# Patient Record
Sex: MALE | Race: WHITE | NOT HISPANIC OR LATINO | Employment: OTHER | URBAN - METROPOLITAN AREA
[De-identification: names, ages, dates, MRNs, and addresses within clinical notes are randomized per-mention and may not be internally consistent; named-entity substitution may affect disease eponyms.]

---

## 2017-11-16 ENCOUNTER — HOSPITAL ENCOUNTER (OUTPATIENT)
Dept: RADIOLOGY | Facility: HOSPITAL | Age: 62
Discharge: HOME | End: 2017-11-16
Attending: ORTHOPAEDIC SURGERY
Payer: COMMERCIAL

## 2017-11-16 ENCOUNTER — GENERIC CONVERSION - ENCOUNTER (OUTPATIENT)
Dept: OTHER | Facility: OTHER | Age: 62
End: 2017-11-16

## 2017-11-16 DIAGNOSIS — Z47.1 AFTERCARE FOLLOWING JOINT REPLACEMENT SURGERY: ICD-10-CM

## 2017-11-16 PROCEDURE — 73560 X-RAY EXAM OF KNEE 1 OR 2: CPT

## 2018-01-11 NOTE — PROGRESS NOTES
Assessment    1  Right wrist pain (342 43) (H86 531)   2  Closed fracture of distal end of right radius, unspecified fracture morphology, initial   encounter ( 42) (S59 924A)    Plan  Closed fracture of distal end of right radius, unspecified fracture morphology, initial  encounter    · Follow-up Visit in 4 Weeks Evaluation and Treatment  Follow-up  Status: Hold For -  Scheduling  Requested for: 17ZQY0860    Discussion/Summary    I have explained my clinical and radiological impressions to the patient and his wife  Presentation at this time consistent with a cortical step off on the anterior aspect of the distal radius reflective of a distal radius fracture  On physical exam, the patient maintains full range of active flexion, extension, supination and pronation  At this time, he will be placed into a short-arm hard cast for wrist stabilization  He may continue with ice and ibuprofen as needed for his pain  He will return to the office for follow-up in 4 weeks  Possible side effects of new medications were reviewed with the patient/guardian today  The treatment plan was reviewed with the patient/guardian  The patient/guardian understands and agrees with the treatment plan      Chief Complaint  Right wrist pain      History of Present Illness  HPI: Patient is a 68-year-old male presenting today for evaluation of his right wrist pain  He reports on September 4 2016, tripping over his stairs and falling on his outstretched right arm  He reports immediate onset of pain in the right wrist which has persisted up until this time  His pain continues with pain over the dorsal aspect of the right wrist  His pain as a throbbing, achy pain with no radiation of symptoms  Pain is minimal and he has only been using ice for his symptoms  He denies any warmth, crepitus, numbness or tingling  Review of Systems    Constitutional: No fever or chills, feels well, no tiredness, no recent weight loss or weight gain     Eyes: No complaints of red eyes, no eyesight problems  ENT: no complaints of loss of hearing, no nosebleeds, no sore throat  Cardiovascular: No complaints of chest pain, no palpitations, no leg claudication or lower extremity edema  Respiratory: No complaints of shortness of breath, no wheezing, no cough  Gastrointestinal: No complaints of abdominal pain, no constipation, no nausea or vomiting, no diarrhea or bloody stools  Genitourinary: No complaints of dysuria or incontinence, no hesitancy, no nocturia  Musculoskeletal: arthralgias, limb pain and myalgias, but as noted in HPI  Integumentary: No complaints of skin rash or lesion, no itching or dry skin, no skin wounds  Neurological: No complaints of headache, no confusion, no numbness or tingling, no dizziness  Psychiatric: No suicidal thoughts, no anxiety, no depression  Endocrine: No muscle weakness, no frequent urination, no excessive thirst, no feelings of weakness  ROS reviewed  Active Problems    1  Aftercare following right knee joint replacement surgery (V54 81,V43 65) (Z47 1,Z96 651)   2  Arthritis of knee, right (716 96) (M19 90)   3  History of tibial fracture (V15 51) (Z87 81)   4  Osteoarthritis of left knee (715 96) (M17 9)   5  Right knee pain (719 46) (M25 561)   6  S/P TKR (total knee replacement) using cement, right (V43 65) (R65 707)    Past Medical History    The active problems and past medical history were reviewed and updated today  Surgical History    The surgical history was reviewed and updated today  Family History  Family History    · Paternal history of Heart disease (429 9) (I51 9)   · Paternal history of Hypertension (401 9) (I10)    The family history was reviewed and updated today  Social History    · Never a smoker  The social history was reviewed and updated today  The social history was reviewed and is unchanged  Current Meds   1  Acetaminophen 325 MG Oral Tablet Recorded   2   Aspirin 81 MG TABS Recorded   3  Clindamycin HCl - 300 MG Oral Capsule; TAKE 2 CAPSULES 1 HOUR PRIOR TO   DENTAL APPOINTMENT; Therapy: [id number] to (Evaluate:05Kqp9293)  Requested for: 89DQF9121; Last   Rx:[id number] Ordered   4  Clindamycin HCl - 300 MG Oral Capsule; TAKE 2 CAPSULES 1 HOUR PRIOR TO   DENTAL APPOINTMENT; Therapy: 69DDN9619 to (Evaluate:21Cdd3910)  Requested for: 18BXP2398; Last   Rx:[id number] Ordered   5  Fenofibrate 145 MG Oral Tablet Recorded   6  Hydrochlorothiazide 25 MG Oral Tablet Recorded   7  Lisinopril 10 MG Oral Tablet Recorded   8  Vitamin D (Ergocalciferol) 04987 UNIT Oral Capsule Recorded    The medication list was reviewed and updated today  Allergies    1  Penicillins   2  Sulfa Drugs    Vitals  Signs    Systolic: 440  Diastolic: 75  Heart Rate: 84  Height: 6 ft 1 in  Weight: 252 lb   BMI Calculated: 33 25  BSA Calculated: 2 37    Physical Exam    Right Wrist: Appearance: swelling  Tenderness: not the volar aspect of the scaphoid, not the dorsal aspect of the scaphoid, not the volar aspect of the hamate, not the dorsal aspect of the hamate, not the volar aspect of the lunate, not the dorsal aspect of the lunate and not the TFCC  Palpatory findings include no crepitus  ROM: Full  Motor: Normal  Special Tests: negative ulnar grind for TFCC pathology  Right Hand: Palpatory findings include no crepitus and no warmth  Constitutional - General appearance: Normal    Musculoskeletal - Gait and station: Normal  Digits and nails: Normal  Muscle strength/tone: Normal    Cardiovascular - Pulses: Normal  Examination of extremities for edema and/or varicosities: Normal    Skin - Skin and subcutaneous tissue: Normal    Neurologic - Sensation: Normal    Psychiatric - Orientation to person, place, and time: Normal  Mood and affect: Normal    Eyes   Conjunctiva and lids: Normal     Pupils and irises: Normal        Results/Data  I personally reviewed the films/images/results in the office today   My interpretation follows  X-ray Review Cortical step off the anterior aspect of the distal radius reflective of a distal radius fracture  Future Appointments    Date/Time Provider Specialty Site   11/16/2016 01:15 PM ROHIT Ayala   Orthopedic Surgery [address]     Signatures   Electronically signed by : Georgette Buchanan DO; Sep  9 2016  2:14PM EST                       (Author)

## 2018-01-22 VITALS — HEART RATE: 108 BPM | SYSTOLIC BLOOD PRESSURE: 114 MMHG | DIASTOLIC BLOOD PRESSURE: 82 MMHG

## 2018-04-27 ENCOUNTER — APPOINTMENT (OUTPATIENT)
Dept: RADIOLOGY | Facility: CLINIC | Age: 63
End: 2018-04-27
Payer: COMMERCIAL

## 2018-04-27 ENCOUNTER — TRANSCRIBE ORDERS (OUTPATIENT)
Dept: ADMINISTRATIVE | Facility: HOSPITAL | Age: 63
End: 2018-04-27

## 2018-04-27 DIAGNOSIS — M25.551 BILATERAL HIP PAIN: ICD-10-CM

## 2018-04-27 DIAGNOSIS — M25.552 BILATERAL HIP PAIN: ICD-10-CM

## 2018-04-27 DIAGNOSIS — M25.551 BILATERAL HIP PAIN: Primary | ICD-10-CM

## 2018-04-27 DIAGNOSIS — M25.552 BILATERAL HIP PAIN: Primary | ICD-10-CM

## 2018-04-27 PROCEDURE — 73522 X-RAY EXAM HIPS BI 3-4 VIEWS: CPT

## 2018-10-30 NOTE — TELEPHONE ENCOUNTER
Caller: Patient   C/B #:   Dr Yahir Kennedy    Patient is going in for dental work on 11/6/18 and needs a script for antibiotics  He uses [facility] in [place], [place]   Thanks

## 2018-10-31 NOTE — TELEPHONE ENCOUNTER
Please call in a prescription for this patient  It should read:  Cephalexin 500 mg  Four tablets 1 hour before dental visit  Number 40, with no refills  Please call patient and inform when completed    Thank you

## 2019-01-11 ENCOUNTER — TRANSCRIBE ORDERS (OUTPATIENT)
Dept: ADMINISTRATIVE | Facility: HOSPITAL | Age: 64
End: 2019-01-11

## 2019-01-11 DIAGNOSIS — D13.5 BENIGN NEOPLASM OF LIVER AND BILIARY PASSAGES: Primary | ICD-10-CM

## 2019-01-11 DIAGNOSIS — D13.4 BENIGN NEOPLASM OF LIVER AND BILIARY PASSAGES: Primary | ICD-10-CM

## 2019-01-19 ENCOUNTER — HOSPITAL ENCOUNTER (OUTPATIENT)
Dept: MRI IMAGING | Facility: HOSPITAL | Age: 64
Discharge: HOME | End: 2019-01-19
Payer: COMMERCIAL

## 2019-01-19 DIAGNOSIS — D13.4 BENIGN NEOPLASM OF LIVER AND BILIARY PASSAGES: ICD-10-CM

## 2019-01-19 DIAGNOSIS — D13.5 BENIGN NEOPLASM OF LIVER AND BILIARY PASSAGES: ICD-10-CM

## 2019-01-19 PROCEDURE — A9585 GADOBUTROL INJECTION: HCPCS | Performed by: DENTIST

## 2019-01-19 PROCEDURE — 74183 MRI ABD W/O CNTR FLWD CNTR: CPT

## 2019-01-19 RX ADMIN — GADOBUTROL 11 ML: 604.72 INJECTION INTRAVENOUS at 09:04

## 2021-04-08 DIAGNOSIS — Z23 ENCOUNTER FOR IMMUNIZATION: ICD-10-CM

## 2021-12-07 ENCOUNTER — NURSE TRIAGE (OUTPATIENT)
Dept: OTHER | Facility: OTHER | Age: 66
End: 2021-12-07

## 2021-12-07 DIAGNOSIS — Z20.822 ENCOUNTER FOR SCREENING LABORATORY TESTING FOR COVID-19 VIRUS: Primary | ICD-10-CM

## 2021-12-07 PROCEDURE — U0003 INFECTIOUS AGENT DETECTION BY NUCLEIC ACID (DNA OR RNA); SEVERE ACUTE RESPIRATORY SYNDROME CORONAVIRUS 2 (SARS-COV-2) (CORONAVIRUS DISEASE [COVID-19]), AMPLIFIED PROBE TECHNIQUE, MAKING USE OF HIGH THROUGHPUT TECHNOLOGIES AS DESCRIBED BY CMS-2020-01-R: HCPCS | Performed by: FAMILY MEDICINE

## 2021-12-07 PROCEDURE — U0005 INFEC AGEN DETEC AMPLI PROBE: HCPCS | Performed by: FAMILY MEDICINE

## 2023-03-17 ENCOUNTER — HOSPITAL ENCOUNTER (OUTPATIENT)
Dept: NON INVASIVE DIAGNOSTICS | Facility: HOSPITAL | Age: 68
Discharge: HOME | End: 2023-03-17

## 2023-03-17 DIAGNOSIS — Z87.891 FORMER SMOKER: ICD-10-CM

## 2023-04-05 ENCOUNTER — OFFICE VISIT (OUTPATIENT)
Dept: DERMATOLOGY | Facility: CLINIC | Age: 68
End: 2023-04-05

## 2023-04-05 VITALS — WEIGHT: 150 LBS | BODY MASS INDEX: 19.88 KG/M2 | HEIGHT: 73 IN

## 2023-04-05 DIAGNOSIS — D23.9 DILATED PORE OF WINER: Primary | ICD-10-CM

## 2023-04-05 DIAGNOSIS — L82.1 SEBORRHEIC KERATOSIS: ICD-10-CM

## 2023-04-05 RX ORDER — ATORVASTATIN CALCIUM 20 MG/1
20 TABLET, FILM COATED ORAL
COMMUNITY
Start: 2023-01-15

## 2023-04-05 RX ORDER — HYDROCHLOROTHIAZIDE 25 MG/1
1 TABLET ORAL DAILY
COMMUNITY

## 2023-04-05 RX ORDER — LISINOPRIL 20 MG/1
20 TABLET ORAL
COMMUNITY
Start: 2023-01-15

## 2023-04-05 RX ORDER — CHLORAL HYDRATE 500 MG
2 CAPSULE ORAL DAILY
COMMUNITY

## 2023-04-05 RX ORDER — OMEPRAZOLE 20 MG/1
20 TABLET, DELAYED RELEASE ORAL EVERY 24 HOURS
COMMUNITY

## 2023-04-05 RX ORDER — CYANOCOBALAMIN (VITAMIN B-12) 500 MCG
TABLET ORAL EVERY 24 HOURS
COMMUNITY

## 2023-04-05 RX ORDER — FENOFIBRATE 145 MG/1
1 TABLET, COATED ORAL DAILY
COMMUNITY

## 2023-04-05 RX ORDER — DIPHENOXYLATE HYDROCHLORIDE AND ATROPINE SULFATE 2.5; .025 MG/1; MG/1
1 TABLET ORAL DAILY
COMMUNITY

## 2023-04-05 RX ORDER — CLINDAMYCIN HYDROCHLORIDE 300 MG/1
2 CAPSULE ORAL
COMMUNITY
Start: 2015-12-01

## 2023-04-05 NOTE — PROGRESS NOTES
"[facility] Note     Patient Name: Roshan Yi  Encounter Date: 4/5/2023     Have you been cared for by a [facility] Dermatologist in the last 3 years and, if so, which description applies to you? NO  I am considered a \"new\" patient and must complete all patient intake questions  I am MALE/not capable of bearing children  REVIEW OF SYSTEMS:  Have you recently had or currently have any of the following? · Recent fever or chills? No  · Any non-healing wound? No   PAST MEDICAL HISTORY:  Have you personally ever had or currently have any of the following? If \"YES,\" then please provide more detail  · Skin cancer (such as Melanoma, Basal Cell Carcinoma, Squamous Cell Carcinoma? No  · Tuberculosis, HIV/AIDS, Hepatitis B or C: No  · Systemic Immunosuppression such as Diabetes, Biologic or Immunotherapy, Chemotherapy, Organ Transplantation, Bone Marrow Transplantation No  · Radiation Treatment No   FAMILY HISTORY:  Any \"first degree relatives\" (parent, brother, sister, or child) with the following? • Skin Cancer, Pancreatic or Other Cancer? No   PATIENT EXPERIENCE:    • Do you want the Dermatologist to perform a COMPLETE skin exam today including a clinical examination under the \"bra and underwear\" areas? Yes   • If necessary, do we have your permission to call and leave a detailed message on your Preferred Phone number that includes your specific medical information?   Yes      Allergies   Allergen Reactions   • Penicillins Edema and Swelling   • Sulfa Antibiotics Edema and Swelling      Current Outpatient Medications:   •  Aspirin Buf,CaCarb-MgCarb-MgO, 81 MG TABS, Take 1 tablet by mouth daily, Disp: , Rfl:   •  atorvastatin (LIPITOR) 20 mg tablet, Take 20 mg by mouth, Disp: , Rfl:   •  clindamycin (CLEOCIN) 300 MG capsule, Take 2 capsules by mouth, Disp: , Rfl:   •  fenofibrate (TRICOR) 145 mg tablet, Take 1 tablet by mouth daily, Disp: , Rfl:   •  Folic Acid 0 8 MG CAPS, every 24 " "hours, Disp: , Rfl:   •  hydrochlorothiazide (HYDRODIURIL) 25 mg tablet, Take 1 tablet by mouth daily, Disp: , Rfl:   •  lisinopril (ZESTRIL) 20 mg tablet, Take 20 mg by mouth, Disp: , Rfl:   •  metFORMIN (GLUCOPHAGE) 500 mg tablet, Take 500 mg by mouth daily, Disp: , Rfl:   •  multivitamin (THERAGRAN) TABS, Take 1 tablet by mouth daily, Disp: , Rfl:   •  Omega-3 1000 MG CAPS, Take 2 g by mouth daily, Disp: , Rfl:   •  omeprazole (PriLOSEC OTC) 20 MG tablet, 20 mg every 24 hours, Disp: , Rfl:   •  Potassium 99 MG TABS, every 24 hours, Disp: , Rfl:           • Whom besides the patient is providing clinical information about today's encounter?   o NO ADDITIONAL HISTORIAN (patient alone provided history)    Physical Exam and Assessment/Plan by Diagnosis:    DILATED PORE OF Meñolene Danger   Physical Exam:  • Anatomic Location Affected:  Left cheek   • Morphological Description:  Dilated pore  • Pertinent Positives:  • Pertinent Negatives: Additional History of Present Condition:  Discovered upon exam     Assessment and Plan:  Based on a thorough discussion of this condition and the management approach to it (including a comprehensive discussion of the known risks, side effects and potential benefits of treatment), the patient (family) agrees to implement the following specific plan:  • Monitor for any changes    SEBORRHEIC KERATOSIS; NON-INFLAMED    Physical Exam:  • Anatomic Location Affected:  Trunk  • Morphological Description:  Flat and raised, waxy, smooth to warty textured, yellow to brownish-grey to dark brown to blackish, discrete, \"stuck-on\" appearing papules  • Pertinent Positives:  • Pertinent Negatives: Additional History of Present Condition:  Patient reports new bumps on the skin  Denies itch, burn, pain, bleeding or ulceration  Present constantly; nothing seems to make it worse or better  No prior treatment        Assessment and Plan:  Based on a thorough discussion of this condition and the management " "approach to it (including a comprehensive discussion of the known risks, side effects and potential benefits of treatment), the patient (family) agrees to implement the following specific plan:  • Reassured benign  • Monitor for any changes     Seborrheic Keratosis  A seborrheic keratosis is a harmless warty spot that appears during adult life as a common sign of skin aging  Seborrheic keratoses can arise on any area of skin, covered or uncovered, with the usual exception of the palms and soles  They do not arise from mucous membranes  Seborrheic keratoses can have highly variable appearance  Seborrheic keratoses are extremely common  It has been estimated that over 90% of adults over the age of 61 years have one or more of them  They occur in males and females of all races, typically beginning to erupt in the 35s or 45s  They are uncommon under the age of 21 years  The precise cause of seborrhoeic keratoses is not known  Seborrhoeic keratoses are considered degenerative in nature  As time goes by, seborrheic keratoses tend to become more numerous  Some people inherit a tendency to develop a very large number of them; some people may have hundreds of them  The name \"seborrheic keratosis\" is misleading, because these lesions are not limited to a seborrhoeic distribution (scalp, mid-face, chest, upper back), nor are they formed from sebaceous glands, nor are they associated with sebum -- which is greasy  Seborrheic keratosis may also be called \"SK,\" \"Seb K,\" \"basal cell papilloma,\" \"senile wart,\" or \"barnacle  \"      Researchers have noted:  • Eruptive seborrhoeic keratoses can follow sunburn or dermatitis  • Skin friction may be the reason they appear in body folds  • Viral cause (e g , human papillomavirus) seems unlikely  • Stable and clonal mutations or activation of FRFR3, PIK3CA, CLIFFORD, AKT1 and EGFR genes are found in seborrhoeic keratoses  • Seborrhoeic keratosis can arise from solar lentigo  • FRFR3 " "mutations also arise in solar lentigines  These mutations are associated with increased age and location on the head and neck, suggesting a role of ultraviolet radiation in these lesions  • Seborrheic keratoses do not harbour tumour suppressor gene mutations  • Epidermal growth factor receptor inhibitors, which are used to treat some cancers, often result in an increase in verrucal (warty) keratoses  There is no easy way to remove multiple lesions on a single occasion  Unless a specific lesion is \"inflamed\" and is causing pain or stinging/burning or is bleeding, most insurance companies do not authorize treatment  Scribe Attestation    I,:  Jessee Chicas am acting as a scribe while in the presence of the attending physician :       I,:  Rafe Bumpers, MD personally performed the services described in this documentation    as scribed in my presence  :           "

## 2023-04-05 NOTE — PATIENT INSTRUCTIONS
"DILATED PORE OF JOSE     Assessment and Plan:  Based on a thorough discussion of this condition and the management approach to it (including a comprehensive discussion of the known risks, side effects and potential benefits of treatment), the patient (family) agrees to implement the following specific plan:  Monitor for any changes    SEBORRHEIC KERATOSIS; NON-INFLAMED    Assessment and Plan:  Based on a thorough discussion of this condition and the management approach to it (including a comprehensive discussion of the known risks, side effects and potential benefits of treatment), the patient (family) agrees to implement the following specific plan:  Reassured benign  Monitor for any changes     Seborrheic Keratosis  A seborrheic keratosis is a harmless warty spot that appears during adult life as a common sign of skin aging  Seborrheic keratoses can arise on any area of skin, covered or uncovered, with the usual exception of the palms and soles  They do not arise from mucous membranes  Seborrheic keratoses can have highly variable appearance  Seborrheic keratoses are extremely common  It has been estimated that over 90% of adults over the age of 61 years have one or more of them  They occur in males and females of all races, typically beginning to erupt in the 35s or 45s  They are uncommon under the age of 21 years  The precise cause of seborrhoeic keratoses is not known  Seborrhoeic keratoses are considered degenerative in nature  As time goes by, seborrheic keratoses tend to become more numerous  Some people inherit a tendency to develop a very large number of them; some people may have hundreds of them  The name \"seborrheic keratosis\" is misleading, because these lesions are not limited to a seborrhoeic distribution (scalp, mid-face, chest, upper back), nor are they formed from sebaceous glands, nor are they associated with sebum -- which is greasy    Seborrheic keratosis may also be called \"SK,\" " "\"Seb K,\" \"basal cell papilloma,\" \"senile wart,\" or \"barnacle  \"      Researchers have noted:  Eruptive seborrhoeic keratoses can follow sunburn or dermatitis  Skin friction may be the reason they appear in body folds  Viral cause (e g , human papillomavirus) seems unlikely  Stable and clonal mutations or activation of FRFR3, PIK3CA, CLIFFORD, AKT1 and EGFR genes are found in seborrhoeic keratoses  Seborrhoeic keratosis can arise from solar lentigo  FRFR3 mutations also arise in solar lentigines  These mutations are associated with increased age and location on the head and neck, suggesting a role of ultraviolet radiation in these lesions  Seborrheic keratoses do not harbour tumour suppressor gene mutations  Epidermal growth factor receptor inhibitors, which are used to treat some cancers, often result in an increase in verrucal (warty) keratoses  There is no easy way to remove multiple lesions on a single occasion  Unless a specific lesion is \"inflamed\" and is causing pain or stinging/burning or is bleeding, most insurance companies do not authorize treatment      "

## 2024-08-12 ENCOUNTER — APPOINTMENT (OUTPATIENT)
Dept: RADIOLOGY | Facility: CLINIC | Age: 69
End: 2024-08-12
Payer: COMMERCIAL

## 2024-08-12 DIAGNOSIS — M17.12 OSTEOARTHRITIS OF LEFT KNEE, UNSPECIFIED OSTEOARTHRITIS TYPE: ICD-10-CM

## 2024-08-12 PROCEDURE — 73564 X-RAY EXAM KNEE 4 OR MORE: CPT

## 2025-01-28 ENCOUNTER — APPOINTMENT (EMERGENCY)
Dept: RADIOLOGY | Facility: HOSPITAL | Age: 70
End: 2025-01-28
Payer: COMMERCIAL

## 2025-01-28 ENCOUNTER — HOSPITAL ENCOUNTER (EMERGENCY)
Facility: HOSPITAL | Age: 70
Discharge: ANOTHER HOSPITAL | End: 2025-01-29
Attending: EMERGENCY MEDICINE
Payer: COMMERCIAL

## 2025-01-28 ENCOUNTER — APPOINTMENT (EMERGENCY)
Dept: CT IMAGING | Facility: HOSPITAL | Age: 70
End: 2025-01-28
Payer: COMMERCIAL

## 2025-01-28 DIAGNOSIS — I21.4 NSTEMI (NON-ST ELEVATED MYOCARDIAL INFARCTION) (HCC): ICD-10-CM

## 2025-01-28 DIAGNOSIS — R79.89 ELEVATED TROPONIN: ICD-10-CM

## 2025-01-28 DIAGNOSIS — R07.9 CHEST PAIN: Primary | ICD-10-CM

## 2025-01-28 PROBLEM — K21.9 GERD (GASTROESOPHAGEAL REFLUX DISEASE): Status: ACTIVE | Noted: 2025-01-28

## 2025-01-28 PROBLEM — E11.9 DM2 (DIABETES MELLITUS, TYPE 2) (HCC): Status: ACTIVE | Noted: 2025-01-28

## 2025-01-28 PROBLEM — I10 ESSENTIAL HYPERTENSION: Status: ACTIVE | Noted: 2025-01-28

## 2025-01-28 PROBLEM — R93.89 ABNORMAL CT SCAN: Status: ACTIVE | Noted: 2025-01-28

## 2025-01-28 PROBLEM — E78.5 HYPERLIPIDEMIA: Status: ACTIVE | Noted: 2025-01-28

## 2025-01-28 LAB
2HR DELTA HS TROPONIN: 539 NG/L
4HR DELTA HS TROPONIN: 4381 NG/L
ALBUMIN SERPL BCG-MCNC: 4.1 G/DL (ref 3.5–5)
ALP SERPL-CCNC: 57 U/L (ref 34–104)
ALT SERPL W P-5'-P-CCNC: 20 U/L (ref 7–52)
ANION GAP SERPL CALCULATED.3IONS-SCNC: 6 MMOL/L (ref 4–13)
APTT PPP: 29 SECONDS (ref 23–34)
APTT PPP: 39 SECONDS (ref 23–34)
APTT PPP: 51 SECONDS (ref 23–34)
APTT PPP: 56 SECONDS (ref 23–34)
AST SERPL W P-5'-P-CCNC: 27 U/L (ref 13–39)
ATRIAL RATE: 68 BPM
ATRIAL RATE: 76 BPM
ATRIAL RATE: 80 BPM
ATRIAL RATE: 85 BPM
BASOPHILS # BLD AUTO: 0.03 THOUSANDS/ΜL (ref 0–0.1)
BASOPHILS NFR BLD AUTO: 0 % (ref 0–1)
BILIRUB SERPL-MCNC: 0.54 MG/DL (ref 0.2–1)
BUN SERPL-MCNC: 20 MG/DL (ref 5–25)
CALCIUM SERPL-MCNC: 9.2 MG/DL (ref 8.4–10.2)
CARDIAC TROPONIN I PNL SERPL HS: 1320 NG/L (ref ?–50)
CARDIAC TROPONIN I PNL SERPL HS: 5162 NG/L (ref ?–50)
CARDIAC TROPONIN I PNL SERPL HS: 781 NG/L (ref ?–50)
CHLORIDE SERPL-SCNC: 103 MMOL/L (ref 96–108)
CHOLEST SERPL-MCNC: 118 MG/DL (ref ?–200)
CO2 SERPL-SCNC: 29 MMOL/L (ref 21–32)
CREAT SERPL-MCNC: 1.14 MG/DL (ref 0.6–1.3)
EOSINOPHIL # BLD AUTO: 0.24 THOUSAND/ΜL (ref 0–0.61)
EOSINOPHIL NFR BLD AUTO: 3 % (ref 0–6)
ERYTHROCYTE [DISTWIDTH] IN BLOOD BY AUTOMATED COUNT: 12.7 % (ref 11.6–15.1)
EST. AVERAGE GLUCOSE BLD GHB EST-MCNC: 131 MG/DL
EST. AVERAGE GLUCOSE BLD GHB EST-MCNC: 137 MG/DL
GFR SERPL CREATININE-BSD FRML MDRD: 64 ML/MIN/1.73SQ M
GLUCOSE SERPL-MCNC: 101 MG/DL (ref 65–140)
GLUCOSE SERPL-MCNC: 109 MG/DL (ref 65–140)
GLUCOSE SERPL-MCNC: 117 MG/DL (ref 65–140)
GLUCOSE SERPL-MCNC: 120 MG/DL (ref 65–140)
HBA1C MFR BLD: 6.2 %
HBA1C MFR BLD: 6.4 %
HCT VFR BLD AUTO: 45.1 % (ref 36.5–49.3)
HDLC SERPL-MCNC: 36 MG/DL
HGB BLD-MCNC: 14.7 G/DL (ref 12–17)
IMM GRANULOCYTES # BLD AUTO: 0.05 THOUSAND/UL (ref 0–0.2)
IMM GRANULOCYTES NFR BLD AUTO: 1 % (ref 0–2)
INR PPP: 1 (ref 0.85–1.19)
LDLC SERPL CALC-MCNC: 65 MG/DL (ref 0–100)
LIPASE SERPL-CCNC: 14 U/L (ref 11–82)
LYMPHOCYTES # BLD AUTO: 2.38 THOUSANDS/ΜL (ref 0.6–4.47)
LYMPHOCYTES NFR BLD AUTO: 26 % (ref 14–44)
MCH RBC QN AUTO: 30.5 PG (ref 26.8–34.3)
MCHC RBC AUTO-ENTMCNC: 32.6 G/DL (ref 31.4–37.4)
MCV RBC AUTO: 94 FL (ref 82–98)
MONOCYTES # BLD AUTO: 1.14 THOUSAND/ΜL (ref 0.17–1.22)
MONOCYTES NFR BLD AUTO: 12 % (ref 4–12)
NEUTROPHILS # BLD AUTO: 5.43 THOUSANDS/ΜL (ref 1.85–7.62)
NEUTS SEG NFR BLD AUTO: 58 % (ref 43–75)
NRBC BLD AUTO-RTO: 0 /100 WBCS
P AXIS: 48 DEGREES
P AXIS: 63 DEGREES
P AXIS: 64 DEGREES
P AXIS: 67 DEGREES
PLATELET # BLD AUTO: 342 THOUSANDS/UL (ref 149–390)
PMV BLD AUTO: 9 FL (ref 8.9–12.7)
POTASSIUM SERPL-SCNC: 4.1 MMOL/L (ref 3.5–5.3)
PR INTERVAL: 182 MS
PR INTERVAL: 184 MS
PR INTERVAL: 186 MS
PR INTERVAL: 188 MS
PROT SERPL-MCNC: 7 G/DL (ref 6.4–8.4)
PROTHROMBIN TIME: 13.7 SECONDS (ref 12.3–15)
QRS AXIS: 34 DEGREES
QRS AXIS: 34 DEGREES
QRS AXIS: 43 DEGREES
QRS AXIS: 46 DEGREES
QRSD INTERVAL: 88 MS
QRSD INTERVAL: 92 MS
QT INTERVAL: 372 MS
QT INTERVAL: 378 MS
QT INTERVAL: 378 MS
QT INTERVAL: 390 MS
QTC INTERVAL: 414 MS
QTC INTERVAL: 418 MS
QTC INTERVAL: 435 MS
QTC INTERVAL: 449 MS
RBC # BLD AUTO: 4.82 MILLION/UL (ref 3.88–5.62)
SODIUM SERPL-SCNC: 138 MMOL/L (ref 135–147)
T WAVE AXIS: 10 DEGREES
T WAVE AXIS: 12 DEGREES
T WAVE AXIS: 20 DEGREES
T WAVE AXIS: 35 DEGREES
TRIGL SERPL-MCNC: 85 MG/DL (ref ?–150)
VENTRICULAR RATE: 68 BPM
VENTRICULAR RATE: 76 BPM
VENTRICULAR RATE: 80 BPM
VENTRICULAR RATE: 85 BPM
WBC # BLD AUTO: 9.27 THOUSAND/UL (ref 4.31–10.16)

## 2025-01-28 PROCEDURE — 82948 REAGENT STRIP/BLOOD GLUCOSE: CPT

## 2025-01-28 PROCEDURE — 99285 EMERGENCY DEPT VISIT HI MDM: CPT

## 2025-01-28 PROCEDURE — 85730 THROMBOPLASTIN TIME PARTIAL: CPT | Performed by: EMERGENCY MEDICINE

## 2025-01-28 PROCEDURE — 85025 COMPLETE CBC W/AUTO DIFF WBC: CPT | Performed by: EMERGENCY MEDICINE

## 2025-01-28 PROCEDURE — 99285 EMERGENCY DEPT VISIT HI MDM: CPT | Performed by: EMERGENCY MEDICINE

## 2025-01-28 PROCEDURE — 71045 X-RAY EXAM CHEST 1 VIEW: CPT

## 2025-01-28 PROCEDURE — 96365 THER/PROPH/DIAG IV INF INIT: CPT

## 2025-01-28 PROCEDURE — 83036 HEMOGLOBIN GLYCOSYLATED A1C: CPT

## 2025-01-28 PROCEDURE — 36415 COLL VENOUS BLD VENIPUNCTURE: CPT | Performed by: EMERGENCY MEDICINE

## 2025-01-28 PROCEDURE — 96375 TX/PRO/DX INJ NEW DRUG ADDON: CPT

## 2025-01-28 PROCEDURE — 80053 COMPREHEN METABOLIC PANEL: CPT | Performed by: EMERGENCY MEDICINE

## 2025-01-28 PROCEDURE — 96376 TX/PRO/DX INJ SAME DRUG ADON: CPT

## 2025-01-28 PROCEDURE — 84484 ASSAY OF TROPONIN QUANT: CPT

## 2025-01-28 PROCEDURE — 80061 LIPID PANEL: CPT

## 2025-01-28 PROCEDURE — 99205 OFFICE O/P NEW HI 60 MIN: CPT | Performed by: INTERNAL MEDICINE

## 2025-01-28 PROCEDURE — 84484 ASSAY OF TROPONIN QUANT: CPT | Performed by: EMERGENCY MEDICINE

## 2025-01-28 PROCEDURE — 99222 1ST HOSP IP/OBS MODERATE 55: CPT | Performed by: INTERNAL MEDICINE

## 2025-01-28 PROCEDURE — 93010 ELECTROCARDIOGRAM REPORT: CPT | Performed by: INTERNAL MEDICINE

## 2025-01-28 PROCEDURE — 85730 THROMBOPLASTIN TIME PARTIAL: CPT

## 2025-01-28 PROCEDURE — 83036 HEMOGLOBIN GLYCOSYLATED A1C: CPT | Performed by: INTERNAL MEDICINE

## 2025-01-28 PROCEDURE — 93005 ELECTROCARDIOGRAM TRACING: CPT

## 2025-01-28 PROCEDURE — 83690 ASSAY OF LIPASE: CPT | Performed by: EMERGENCY MEDICINE

## 2025-01-28 PROCEDURE — 85610 PROTHROMBIN TIME: CPT | Performed by: EMERGENCY MEDICINE

## 2025-01-28 PROCEDURE — 96366 THER/PROPH/DIAG IV INF ADDON: CPT

## 2025-01-28 PROCEDURE — 71250 CT THORAX DX C-: CPT

## 2025-01-28 RX ORDER — LISINOPRIL 20 MG/1
20 TABLET ORAL DAILY
Status: DISCONTINUED | OUTPATIENT
Start: 2025-01-28 | End: 2025-01-29 | Stop reason: HOSPADM

## 2025-01-28 RX ORDER — ASPIRIN 81 MG/1
324 TABLET, CHEWABLE ORAL ONCE
Status: COMPLETED | OUTPATIENT
Start: 2025-01-28 | End: 2025-01-28

## 2025-01-28 RX ORDER — ASPIRIN 81 MG/1
81 TABLET, CHEWABLE ORAL DAILY
Status: DISCONTINUED | OUTPATIENT
Start: 2025-01-29 | End: 2025-01-29 | Stop reason: HOSPADM

## 2025-01-28 RX ORDER — PANTOPRAZOLE SODIUM 20 MG/1
20 TABLET, DELAYED RELEASE ORAL
Status: DISCONTINUED | OUTPATIENT
Start: 2025-01-29 | End: 2025-01-29 | Stop reason: HOSPADM

## 2025-01-28 RX ORDER — INSULIN LISPRO 100 [IU]/ML
1-6 INJECTION, SOLUTION INTRAVENOUS; SUBCUTANEOUS
Status: DISCONTINUED | OUTPATIENT
Start: 2025-01-28 | End: 2025-01-29 | Stop reason: HOSPADM

## 2025-01-28 RX ORDER — HEPARIN SODIUM 1000 [USP'U]/ML
1950 INJECTION, SOLUTION INTRAVENOUS; SUBCUTANEOUS EVERY 6 HOURS PRN
Status: DISCONTINUED | OUTPATIENT
Start: 2025-01-28 | End: 2025-01-29

## 2025-01-28 RX ORDER — HEPARIN SODIUM 10000 [USP'U]/100ML
3-20 INJECTION, SOLUTION INTRAVENOUS
Status: DISCONTINUED | OUTPATIENT
Start: 2025-01-28 | End: 2025-01-29

## 2025-01-28 RX ORDER — METOPROLOL TARTRATE 25 MG/1
25 TABLET, FILM COATED ORAL EVERY 12 HOURS SCHEDULED
Status: DISCONTINUED | OUTPATIENT
Start: 2025-01-28 | End: 2025-01-29 | Stop reason: HOSPADM

## 2025-01-28 RX ORDER — MAGNESIUM HYDROXIDE/ALUMINUM HYDROXICE/SIMETHICONE 120; 1200; 1200 MG/30ML; MG/30ML; MG/30ML
30 SUSPENSION ORAL ONCE
Status: CANCELLED | OUTPATIENT
Start: 2025-01-28 | End: 2025-01-28

## 2025-01-28 RX ORDER — ASPIRIN 81 MG/1
81 TABLET, CHEWABLE ORAL DAILY
COMMUNITY

## 2025-01-28 RX ORDER — HEPARIN SODIUM 1000 [USP'U]/ML
3900 INJECTION, SOLUTION INTRAVENOUS; SUBCUTANEOUS ONCE
Status: COMPLETED | OUTPATIENT
Start: 2025-01-28 | End: 2025-01-28

## 2025-01-28 RX ORDER — ATORVASTATIN CALCIUM 40 MG/1
80 TABLET, FILM COATED ORAL
Status: DISCONTINUED | OUTPATIENT
Start: 2025-01-28 | End: 2025-01-29 | Stop reason: HOSPADM

## 2025-01-28 RX ORDER — ACETAMINOPHEN 325 MG/1
650 TABLET ORAL EVERY 6 HOURS PRN
Status: DISCONTINUED | OUTPATIENT
Start: 2025-01-28 | End: 2025-01-29 | Stop reason: HOSPADM

## 2025-01-28 RX ORDER — FOLIC ACID 1 MG/1
1 TABLET ORAL DAILY
Status: DISCONTINUED | OUTPATIENT
Start: 2025-01-28 | End: 2025-01-29 | Stop reason: HOSPADM

## 2025-01-28 RX ORDER — NITROGLYCERIN 0.4 MG/1
0.4 TABLET SUBLINGUAL
Status: DISCONTINUED | OUTPATIENT
Start: 2025-01-28 | End: 2025-01-29 | Stop reason: HOSPADM

## 2025-01-28 RX ORDER — HEPARIN SODIUM 1000 [USP'U]/ML
3900 INJECTION, SOLUTION INTRAVENOUS; SUBCUTANEOUS EVERY 6 HOURS PRN
Status: DISCONTINUED | OUTPATIENT
Start: 2025-01-28 | End: 2025-01-29

## 2025-01-28 RX ORDER — PANTOPRAZOLE SODIUM 40 MG/10ML
40 INJECTION, POWDER, LYOPHILIZED, FOR SOLUTION INTRAVENOUS ONCE
Status: COMPLETED | OUTPATIENT
Start: 2025-01-28 | End: 2025-01-28

## 2025-01-28 RX ORDER — ATORVASTATIN CALCIUM 40 MG/1
20 TABLET, FILM COATED ORAL
Status: DISCONTINUED | OUTPATIENT
Start: 2025-01-28 | End: 2025-01-28

## 2025-01-28 RX ADMIN — PANTOPRAZOLE SODIUM 40 MG: 40 INJECTION, POWDER, FOR SOLUTION INTRAVENOUS at 02:43

## 2025-01-28 RX ADMIN — METOPROLOL TARTRATE 25 MG: 25 TABLET, FILM COATED ORAL at 17:08

## 2025-01-28 RX ADMIN — HEPARIN SODIUM 3900 UNITS: 1000 INJECTION INTRAVENOUS; SUBCUTANEOUS at 04:04

## 2025-01-28 RX ADMIN — HEPARIN SODIUM 1950 UNITS: 1000 INJECTION INTRAVENOUS; SUBCUTANEOUS at 17:05

## 2025-01-28 RX ADMIN — HEPARIN SODIUM 12 UNITS/KG/HR: 10000 INJECTION, SOLUTION INTRAVENOUS at 04:04

## 2025-01-28 RX ADMIN — HEPARIN SODIUM 3900 UNITS: 1000 INJECTION INTRAVENOUS; SUBCUTANEOUS at 10:36

## 2025-01-28 RX ADMIN — LISINOPRIL 20 MG: 20 TABLET ORAL at 09:12

## 2025-01-28 RX ADMIN — ATORVASTATIN CALCIUM 80 MG: 40 TABLET, FILM COATED ORAL at 17:07

## 2025-01-28 RX ADMIN — ASPIRIN 81 MG CHEWABLE TABLET 324 MG: 81 TABLET CHEWABLE at 02:42

## 2025-01-28 RX ADMIN — HEPARIN SODIUM 1950 UNITS: 1000 INJECTION INTRAVENOUS; SUBCUTANEOUS at 22:52

## 2025-01-28 RX ADMIN — FOLIC ACID 1 MG: 1 TABLET ORAL at 09:12

## 2025-01-28 NOTE — ED NOTES
Pt instructed to use call bell if onset of chest pain to repeat an EKG during that time       Calli Clements RN  01/28/25 0250

## 2025-01-28 NOTE — ASSESSMENT & PLAN NOTE
Lab Results   Component Value Date    HGBA1C 6.3 (H) 11/25/2015     Originally, patient explains that he was on metformin 500 mg, daily.  However, no longer taking this medication.    Will update hemoglobin A1c, as last appears to be collected in 2015.    Will order Accu-Cheks.  Will order SSI, as needed.

## 2025-01-28 NOTE — ASSESSMENT & PLAN NOTE
Patient presented with about 3-4 weeks of nonradiating, burning chest pain, which he first attributed to viral illness.  Patient expressed that the pain was exerted with physical activity, but resolved with rest.  Per patient, pain is intermittent and has only happened a few times over the course of 3 weeks.  This morning, patient explains that pain was progressively worse and unresolving, which ultimately made decision for him to present to facility.  He describes the pain as a 8 out of 10.  Per patient, history of hypertension and hyperlipidemia. No known history of CAD.   In ED, troponin collected and elevated with hs Tnl 0hr (781), hs Tnl 2hr (1,320)/ Delta (539).   Awaiting results of 4-hour troponin.   ED discussed symptoms, troponins, and EKGs with cardiology.  It was reccommended patient should be transferred to [facility].  Accepted at [facility] but currently at capacity.   Awaiting bed availability and transfer to [facility].   Plan   In ED, received aspirin 324 mg. Will continue 81 mg, daily.  Will order lipid panel and hemoglobin A1c.   Continue heparin drip.   Continue to trend troponins and await peak.   Control pain/discomfort with PRN nitroglycerin.   Continue telemetry.   Appreciate Cardiology consultation and expertise.

## 2025-01-28 NOTE — CONSULTS
"Consultation - Hospitalist   Name: Brandon Thompson 69 y.o. male I MRN: [id number]  Unit/Bed#: ED 04 I Date of Admission: 1/28/2025   Date of Service: 1/28/2025 I Hospital Day: 0       Consult to internal medicine  Consult performed by: JOSE Montelongo  Consult ordered by: Kristin Diggs DO  Reason for consult: chest pain        Physician Requesting Evaluation: Kristin Diggs DO   Reason for Evaluation / Principal Problem: chest pain     Assessment & Plan  Chest pain  Patient presented with about 3-4 weeks of nonradiating, burning chest pain, which he first attributed to viral illness.  Patient expressed that the pain was exerted with physical activity, but resolved with rest.  Per patient, pain is intermittent and has only happened a few times over the course of 3 weeks.  This morning, patient explains that pain was progressively worse and unresolving, which ultimately made decision for him to present to facility.  He describes the pain as a 8 out of 10.  Per patient, history of hypertension and hyperlipidemia. No known history of CAD.   In ED, troponin collected and elevated with hs Tnl 0hr (781), hs Tnl 2hr (1,320)/ Delta (539).   Awaiting results of 4-hour troponin.   ED discussed symptoms, troponins, and EKGs with cardiology.  It was reccommended patient should be transferred to [facility].  Accepted at [facility] but currently at capacity.   Awaiting bed availability and transfer to [place].   Plan   In ED, received aspirin 324 mg. Will continue 81 mg, daily.  Will order lipid panel and hemoglobin A1c.   Continue heparin drip.   Continue to trend troponins and await peak.   Control pain/discomfort with PRN nitroglycerin.   Continue telemetry.   Appreciate Cardiology consultation and expertise.  Abnormal CT scan  In ED, CT chest without contrast completed with findings of \"No acute abnormality identified. Findings consistent with fibrotic interstitial lung disease.\"   Could consider pulmonology consult.  Would encourage " "continued follow-up with pulmonology in outpatient setting.  Essential hypertension  Will continue home regimen of Lisinopril 20 mg, daily.  Continue to monitor vital signs, per unit protocol.   GERD (gastroesophageal reflux disease)  Will continue home regimen of omeprazole 20 mg, daily.  Type 2 diabetes mellitus without complication, without long-term current use of insulin ([facility])    Lab Results   Component Value Date    HGBA1C 6.3 (H) 11/25/2015     Originally, patient explains that he was on metformin 500 mg, daily.  However, no longer taking this medication.    Will update hemoglobin A1c, as last appears to be collected in 2015.    Will order Accu-Cheks.  Will order SSI, as needed.    Hyperlipidemia  Will continue home regimen of atorvastatin 20 mg, daily.   Will update lipid panel.    VTE Pharmacologic Prophylaxis: VTE Score: 3 Moderate Risk (Score 3-4) - Pharmacological DVT Prophylaxis Ordered: heparin drip.  Discussion with family: Updated  (wife) at bedside.    Anticipated Length of Stay: Pending transfer to [place].     History of Present Illness   Chief Complaint: \"The pain has been getting progressively worse and not letting up.\"    Brandon Thompson is a 69 y.o. male with a PMH of HTN, HLD, and GERD who presents with worsening chest pain.  Per patient, he explains that he had diffuse chest pain across chest, which he noted around December.  However, during this time patient was noted to be completing physical activities outside and going to the gym.  Also, during this time patient noted that he had a viral illness, which he attributed the symptoms of chest pain to viral syndrome.  However, he noted that the pain was intermittent and persistent, which seemed odd to him as he is no longer sick.  Patient expresses that the chest pain that he is experiencing has been starting to progressively worse and takes longer to relieve.  Unfortunately, the patient expresses that his father " unfortunately passed away on Sunday.  This morning, patient expresses that the pain came on suddenly, noting diffuse bilateral chest pain of a burning nature.  However, due to the severity and lack of relief it was ultimately decided for the patient to have further evaluation at [facility].     On arrival, patient noted to have increased troponins without ST elevation.  Patient expresses that he did have chest pain on arrival, but has not had chest pain since.  Patient will be transferred to [place] for cardiac catheterization and further evaluation by the cardiology team.    Review of Systems   Constitutional:  Negative for activity change, chills, fatigue and fever.   HENT:  Negative for congestion, postnasal drip, rhinorrhea and sinus pressure.    Respiratory:  Negative for cough, chest tightness, shortness of breath and wheezing.    Cardiovascular:  Positive for chest pain. Negative for palpitations and leg swelling.   Gastrointestinal:  Negative for abdominal pain, constipation, diarrhea, nausea and vomiting.   Genitourinary:  Negative for dysuria.   Musculoskeletal:  Negative for arthralgias, joint swelling and myalgias.   Neurological:  Negative for dizziness, syncope, weakness, light-headedness, numbness and headaches.   Psychiatric/Behavioral:  Negative for confusion.        Historical Information   No past medical history on file.  No past surgical history on file.  Social History     Tobacco Use    Smoking status: Never    Smokeless tobacco: Never   Substance and Sexual Activity    Alcohol use: Yes     Alcohol/week: 1.0 standard drink of alcohol     Types: 1 Standard drinks or equivalent per week    Drug use: Never    Sexual activity: Not Currently     E-Cigarette/Vaping     E-Cigarette/Vaping Substances       Social History:  Marital Status: /Civil Union   Patient Pre-hospital Living Situation: Home  Patient Pre-hospital Level of Mobility: walks    Meds/Allergies   I reviewed the home meds with the  patient and patient's wife in the emergency department..   Prior to Admission medications    Medication Sig Start Date End Date Taking? Authorizing Provider   aspirin 81 mg chewable tablet Chew 81 mg daily   Yes Historical Provider, MD   atorvastatin (LIPITOR) 20 mg tablet Take 20 mg by mouth 1/15/23  Yes Historical Provider, MD   clindamycin (CLEOCIN) 300 MG capsule Take 2 capsules by mouth 12/1/15  Yes Historical Provider, MD   Folic Acid 0.8 MG CAPS every 24 hours   Yes Historical Provider, MD   lisinopril (ZESTRIL) 20 mg tablet Take 20 mg by mouth 1/15/23  Yes Historical Provider, MD   multivitamin (THERAGRAN) TABS Take 1 tablet by mouth daily   Yes Historical Provider, MD   [place]-3 1000 MG CAPS Take 2 g by mouth daily   Yes Historical Provider, MD   omeprazole (PriLOSEC OTC) 20 MG tablet 20 mg every 24 hours   Yes Historical Provider, MD   Potassium 99 MG TABS every 24 hours   Yes Historical Provider, MD   Aspirin Buf,CaCarb-MgCarb-MgO, 81 MG TABS Take 1 tablet by mouth daily    Historical Provider, MD   fenofibrate (TRICOR) 145 mg tablet Take 1 tablet by mouth daily    Historical Provider, MD   hydrochlorothiazide (HYDRODIURIL) 25 mg tablet Take 1 tablet by mouth daily  Patient not taking: Reported on 1/28/2025 1/28/25  Historical Provider, MD   metFORMIN (GLUCOPHAGE) 500 mg tablet Take 500 mg by mouth daily  Patient not taking: Reported on 1/28/2025 1/20/23 1/28/25  Historical Provider, MD     Allergies   Allergen Reactions    Penicillins Edema and Swelling    Sulfa Antibiotics Edema and Swelling       Objective :  Temp:  [98.5 °F (36.9 °C)] 98.5 °F (36.9 °C)  HR:  [59-82] 59  BP: (102-137)/(57-83) 102/57  Resp:  [13-20] 19  SpO2:  [96 %-100 %] 97 %  O2 Device: None (Room air)    Physical Exam  Vitals and nursing note reviewed.   Constitutional:       General: He is awake. He is not in acute distress.     Appearance: He is not ill-appearing.   HENT:      Head: Normocephalic and atraumatic.   Eyes:       General: No scleral icterus.     Conjunctiva/sclera: Conjunctivae normal.      Comments: On exam, patient wears glasses.   Cardiovascular:      Rate and Rhythm: Normal rate and regular rhythm.      Pulses:           Radial pulses are 2+ on the right side and 2+ on the left side.        Dorsalis pedis pulses are 1+ on the right side and 1+ on the left side.      Heart sounds: Normal heart sounds, S1 normal and S2 normal. No murmur heard.     Comments: On exam, patient's extremities are well-perfused and warm.  Pulmonary:      Effort: Pulmonary effort is normal.      Breath sounds: Normal breath sounds and air entry. No decreased air movement. No decreased breath sounds, wheezing, rhonchi or rales.   Abdominal:      General: Bowel sounds are normal.      Palpations: Abdomen is soft.      Tenderness: There is no abdominal tenderness.   Musculoskeletal:      Right lower leg: No edema.      Left lower leg: No edema.   Skin:     General: Skin is warm and dry.      Comments: On exam, no evidence of open lesions or wounds on exposed skin.    Neurological:      Mental Status: He is alert and oriented to person, place, and time.      Motor: Motor function is intact.      Coordination: Coordination is intact.         Lines/Drains:        Lab Results: I have reviewed the following results:  Results from last 7 days   Lab Units 01/28/25  0240   WBC Thousand/uL 9.27   HEMOGLOBIN g/dL 14.7   HEMATOCRIT % 45.1   PLATELETS Thousands/uL 342   SEGS PCT % 58   LYMPHO PCT % 26   MONO PCT % 12   EOS PCT % 3     Results from last 7 days   Lab Units 01/28/25  0240   SODIUM mmol/L 138   POTASSIUM mmol/L 4.1   CHLORIDE mmol/L 103   CO2 mmol/L 29   BUN mg/dL 20   CREATININE mg/dL 1.14   ANION GAP mmol/L 6   CALCIUM mg/dL 9.2   ALBUMIN g/dL 4.1   TOTAL BILIRUBIN mg/dL 0.54   ALK PHOS U/L 57   ALT U/L 20   AST U/L 27   GLUCOSE RANDOM mg/dL 120     Results from last 7 days   Lab Units 01/28/25  0335   INR  1.00         Lab Results   Component  Value Date    HGBA1C 6.3 (H) 11/25/2015           Imaging Results Review: I reviewed radiology reports from this admission including: CT chest.  Other Study Results Review: EKG was reviewed.     Administrative Statements       ** Please Note: This note has been constructed using a voice recognition system. **

## 2025-01-28 NOTE — ASSESSMENT & PLAN NOTE
Patient takes atorvastatin 20 mg daily, and tricor 145 mg   1/28/25 lipid panel: Tchol 118, LDL 65, HDL 36

## 2025-01-28 NOTE — EMTALA/ACUTE CARE TRANSFER
[facility] EMERGENCY DEPARTMENT  [address]  [facility] [address]  Dept: [phone]      EMTALA TRANSFER CONSENT    NAME Brandon Thompson                                         1955                              MRN [id number]    I have been informed of my rights regarding examination, treatment, and transfer   by Dr. Kristin Diggs DO    Benefits: Specialized equipment and/or services available at the receiving facility (Include comment)________________________ (Cardiac Cath/Interventional Cardiology)    Risks: Potential for delay in receiving treatment, Potential deterioration of medical condition, Loss of IV, Increased discomfort during transfer      Consent for Transfer:  I acknowledge that my medical condition has been evaluated and explained to me by the emergency department physician or other qualified medical person and/or my attending physician, who has recommended that I be transferred to the service of  Accepting Physician: Dr. Marcial at Accepting Facility Name, City & State : [facility]. The above potential benefits of such transfer, the potential risks associated with such transfer, and the probable risks of not being transferred have been explained to me, and I fully understand them.  The doctor has explained that, in my case, the benefits of transfer outweigh the risks.  I agree to be transferred.    I authorize the performance of emergency medical procedures and treatments upon me in both transit and upon arrival at the receiving facility.  Additionally, I authorize the release of any and all medical records to the receiving facility and request they be transported with me, if possible.  I understand that the safest mode of transportation during a medical emergency is an ambulance and that the Hospital advocates the use of this mode of transport. Risks of traveling to the receiving facility by car, including absence of medical control, life sustaining equipment, such  as oxygen, and medical personnel has been explained to me and I fully understand them.    (MANOLO CORRECT BOX BELOW)  [  ]  I consent to the stated transfer and to be transported by ambulance/helicopter.  [  ]  I consent to the stated transfer, but refuse transportation by ambulance and accept full responsibility for my transportation by car.  I understand the risks of non-ambulance transfers and I exonerate the Hospital and its staff from any deterioration in my condition that results from this refusal.    X___________________________________________    DATE  25  TIME________  Signature of patient or legally responsible individual signing on patient behalf           RELATIONSHIP TO PATIENT_________________________          Provider Certification    NAME Brandon Thompson                                         1955                              MRN [id number]    A medical screening exam was performed on the above named patient.  Based on the examination:    Condition Necessitating Transfer The primary encounter diagnosis was Chest pain. A diagnosis of Elevated troponin was also pertinent to this visit.    Patient Condition: The patient has been stabilized such that within reasonable medical probability, no material deterioration of the patient condition or the condition of the unborn child(marisol) is likely to result from the transfer    Reason for Transfer: Level of Care needed not available at this facility    Transfer Requirements: Facility B   Space available and qualified personnel available for treatment as acknowledged by    Agreed to accept transfer and to provide appropriate medical treatment as acknowledged by       Dr. Marcial  Appropriate medical records of the examination and treatment of the patient are provided at the time of transfer   STAFF INITIAL WHEN COMPLETED _______  Transfer will be performed by qualified personnel from    and appropriate transfer equipment as required, including the  use of necessary and appropriate life support measures.    Provider Certification: I have examined the patient and explained the following risks and benefits of being transferred/refusing transfer to the patient/family:         Based on these reasonable risks and benefits to the patient and/or the unborn child(marisol), and based upon the information available at the time of the patient’s examination, I certify that the medical benefits reasonably to be expected from the provision of appropriate medical treatments at another medical facility outweigh the increasing risks, if any, to the individual’s medical condition, and in the case of labor to the unborn child, from effecting the transfer.    X____________________________________________ DATE 01/28/25        TIME_______      ORIGINAL - SEND TO MEDICAL RECORDS   COPY - SEND WITH PATIENT DURING TRANSFER

## 2025-01-28 NOTE — ASSESSMENT & PLAN NOTE
"In ED, CT chest without contrast completed with findings of \"No acute abnormality identified. Findings consistent with fibrotic interstitial lung disease.\"   Could consider pulmonology consult.  Would encourage continued follow-up with pulmonology in outpatient setting.  "

## 2025-01-28 NOTE — ED CARE HANDOFF
Emergency Department Sign Out Note        Sign out and transfer of care from Dr. Diggs. See Separate Emergency Department note.     The patient, Brandon Thompson, was evaluated by the previous provider for NSTEMI.    Workup Completed:  See previous notes    ED Course / Workup Pending (followup):  EKG w/o evidence of STEMI per my review. Elevated troponin. Delta trop +500. Vitals okay. On heparin gtt. SLIM and cardiology consulted for medical management. Plan for transfer to [facility] pending bed availability.                                      Procedures  Medical Decision Making  Amount and/or Complexity of Data Reviewed  Labs: ordered.  Radiology: ordered.    Risk  OTC drugs.  Prescription drug management.            Disposition  Final diagnoses:   Chest pain   Elevated troponin   NSTEMI (non-ST elevated myocardial infarction) (HCC)     Time reflects when diagnosis was documented in both MDM as applicable and the Disposition within this note       Time User Action Codes Description Comment    1/28/2025  3:45 AM Kristin Diggs Add [R07.9] Chest pain     1/28/2025  3:45 AM Kristin Diggs Add [R79.89] Elevated troponin     1/28/2025  3:45 AM Kristin Diggs Remove [R79.89] Elevated troponin     1/28/2025  3:45 AM Dontae Kristin Add [R79.89] Elevated troponin     1/28/2025  6:07 AM Kristin Diggs Add [I21.4] NSTEMI (non-ST elevated myocardial infarction) (HCC)           ED Disposition       ED Disposition   Transfer to Another Facility-In Network    Condition   --    Date/Time   Tue Jan 28, 2025  4:12 AM    Comment   Brandon Thompson should be transferred out to hospitals, Dr. Marcial.               MD Documentation      Flowsheet Row Most Recent Value   Patient Condition The patient has been stabilized such that within reasonable medical probability, no material deterioration of the patient condition or the condition of the unborn child(marisol) is likely to result from the transfer   Reason for Transfer Level of Care needed not available at  this facility   Benefits of Transfer Specialized equipment and/or services available at the receiving facility (Include comment)________________________  [Cardiac Cath/Interventional Cardiology]   Risks of Transfer Potential for delay in receiving treatment, Potential deterioration of medical condition, Loss of IV, Increased discomfort during transfer   Accepting Physician Dr. Marcial   Accepting Facility Name, City & State  [facility]   Sending MD Dr. Diggs          RN Documentation      Flowsheet Row Most Recent Value   Accepting Facility Name, City & State  [facility]          Follow-up Information    None       Patient's Medications   Discharge Prescriptions    No medications on file     No discharge procedures on file.       ED Provider  Electronically Signed by     César Gusman DO  01/28/25 0737

## 2025-01-28 NOTE — ASSESSMENT & PLAN NOTE
Patient presents with burning chest discomfort which began intermittently in December, which he associated with a viral illness.  Pain occurred in bilateral chest without associated symptoms, occurring with rest and activity, typically for less than 5 minutes, resolving without intervention.  Last night the chest burning lasted longer than 5 minutes prompting him to seek evaluation in the ER.     EKG on arrival with non specific ST changes, Troponin's elevated 720<[id number]  - he was given 324 of aspirin and a heparin drip was initiated   CONNIE pending

## 2025-01-28 NOTE — ASSESSMENT & PLAN NOTE
Will continue home regimen of Lisinopril 20 mg, daily.  Continue to monitor vital signs, per unit protocol.

## 2025-01-28 NOTE — ED PROVIDER NOTES
Time reflects when diagnosis was documented in both MDM as applicable and the Disposition within this note       Time User Action Codes Description Comment    1/28/2025  3:45 AM Kristin Diggs [R07.9] Chest pain     1/28/2025  3:45 AM Kristin Diggs Add [R79.89] Elevated troponin     1/28/2025  3:45 AM Kristin Diggs Remove [R79.89] Elevated troponin     1/28/2025  3:45 AM Kristin Diggs Add [R79.89] Elevated troponin     1/28/2025  6:07 AM Kristin Diggs Add [I21.4] NSTEMI (non-ST elevated myocardial infarction) (HCC)           ED Disposition       ED Disposition   Transfer to Another Facility-In Network    Condition   --    Date/Time   Tue Jan 28, 2025  4:12 AM    Comment   Brandon JOE Donna should be transferred out to [facility], Dr. Marcial.               Assessment & Plan       Medical Decision Making  69-year-old male with substernal and epigastric pain, differential diagnosis includes ACS, arrhythmia, pneumothorax, pneumonia, esophagitis, gastritis, pancreatitis, myocarditis, pericarditis, pleuritis, bronchitis  Initial EKG patient had minimal ST elevation with inverted T wave in lead III with no previous EKG available, does not meet STEMI criteria at the time of presentation has no active chest pain currently, EKG with nonspecific changes on subsequent serial EKGs.  Given patient's intermittent symptoms in setting of likely upper respiratory infection symptoms may be in setting of myopericarditis however patient does have risk factors for cardiac disease including age and hypertension as well as hyperlipidemia will discuss with cardiology patient will likely require admission for further cardiac evaluation    Amount and/or Complexity of Data Reviewed  Labs: ordered.  Radiology: ordered. Decision-making details documented in ED Course.    Risk  OTC drugs.  Prescription drug management.        ED Course as of 01/29/25 0354   Tue Jan 28, 2025   0228 Procedure Note: EKG  Date/Time: 01/28/25 2:40 AM   Performed by: VIOLET  DANIS  Authorized by: DANIS LAZO  Indications / Diagnosis: CP  ECG reviewed by me, the ED Provider: yes   The EKG demonstrates:  Rhythm: normal sinus  Intervals: normal intervals  Axis: normal axis  QRS/Blocks: normal QRS  ST Changes: TENISHA <1 mm in III,   I aVL with minimal STD <1 mm  .  Inverted T wave in III       0236 Procedure Note: EKG  Date/Time: 01/28/25 2:41 AM   Performed by: DANIS LAZO  Authorized by: DANIS LAZO  Indications / Diagnosis: CP  ECG reviewed by me, the ED Provider: yes   The EKG demonstrates:  Rhythm: normal sinus  Intervals: normal intervals  Axis: normal axis  QRS/Blocks: normal QRS  ST Changes: Nonspecific changes, inverted T wave in III, minimal STD            0242 Patient currently not having any significant chest discomfort, EKG does not meet STEMI criteria at this point   0335 Patient with elevated initial troponin remains pain-free, EKG with nonspecific changes does not meet STEMI criteria at this point, given elevated troponin concerning chest pain starting on heparin, discussing with cardiology as patient will likely require transfer   0338 Discussing with cardiology, will transfer to [facility] for further evaluation getting heparin, aspirin will hold off on dual antiplatelets   0346 XR chest portable  Left sided hazy infiltrate   0411 Patient reported some tingling in his anterior chest wall, repeated EKG without any acute ischemic changes   0412 Accepted by MAXIMILIAN Madrigal   0607 Discussed with PACS currently no beds available at [facility], slim consult placed, cardiology consult placed       Medications   heparin (porcine) 25,000 units in 0.45% NaCl 250 mL infusion (premix) (20 Units/kg/hr × 65 kg (Order-Specific) Intravenous New Bag 1/29/25 0149)   heparin (porcine) injection 3,900 Units (3,900 Units Intravenous Given 1/28/25 1036)   heparin (porcine) injection 1,950 Units (1,950 Units Intravenous Given 1/28/25 2252)   aspirin chewable tablet 81 mg (has no  "administration in time range)   folic acid (FOLVITE) tablet 1 mg (1 mg Oral Given 1/28/25 0912)   lisinopril (ZESTRIL) tablet 20 mg (20 mg Oral Given 1/28/25 0912)   pantoprazole (PROTONIX) EC tablet 20 mg (has no administration in time range)   acetaminophen (TYLENOL) tablet 650 mg (has no administration in time range)   insulin lispro (HumALOG/ADMELOG) 100 units/mL subcutaneous injection 1-6 Units ( Subcutaneous Not Given 1/28/25 1559)   insulin lispro (HumALOG/ADMELOG) 100 units/mL subcutaneous injection 1-6 Units ( Subcutaneous Not Given 1/28/25 2238)   nitroglycerin (NITROSTAT) SL tablet 0.4 mg (has no administration in time range)   metoprolol tartrate (LOPRESSOR) tablet 25 mg (25 mg Oral Given 1/28/25 1708)   atorvastatin (LIPITOR) tablet 80 mg (80 mg Oral Given 1/28/25 1707)   pantoprazole (PROTONIX) injection 40 mg (40 mg Intravenous Given 1/28/25 0243)   aspirin chewable tablet 324 mg (324 mg Oral Given 1/28/25 0242)   heparin (porcine) injection 3,900 Units (3,900 Units Intravenous Given 1/28/25 0404)       ED Risk Strat Scores                          SBIRT 20yo+      Flowsheet Row Most Recent Value   Initial Alcohol Screen: US AUDIT-C     1. How often do you have a drink containing alcohol? 0 Filed at: 01/28/2025 0230   2. How many drinks containing alcohol do you have on a typical day you are drinking?  0 Filed at: 01/28/2025 0230   3a. Male UNDER 65: How often do you have five or more drinks on one occasion? 0 Filed at: 01/28/2025 0230   Audit-C Score 0 Filed at: 01/28/2025 0230   JOHNNY: How many times in the past year have you...    Used an illegal drug or used a prescription medication for non-medical reasons? Never Filed at: 01/28/2025 0230                            History of Present Illness       Chief Complaint   Patient presents with    Chest Pain     Pt states chest \"burning\" that woke him up from sleep around 0100.       No past medical history on file.   No past surgical history on file. "   No family history on file.   Social History     Tobacco Use    Smoking status: Never    Smokeless tobacco: Never   Substance Use Topics    Alcohol use: Yes     Alcohol/week: 1.0 standard drink of alcohol     Types: 1 Standard drinks or equivalent per week    Drug use: Never      E-Cigarette/Vaping      E-Cigarette/Vaping Substances      I have reviewed and agree with the history as documented.     69-year-old male with history of hypertension hyperlipidemia presents for evaluation of bilateral mid chest burning sensation that woke him up from sleep, lasted approximately 1 hour and since almost entirely resolved no associated shortness of breath.  He does state that he had some diaphoresis this morning but currently no longer feels it. pain in nonexertional.  It is not radiating states that he has been having this discomfort on and off for the last couple of weeks initially felt it was related to an upper respiratory infection and was treated it with over-the-counter medications.  He had a dry cough, no significant shortness of breath, no fevers no pleuritic chest pain.  Initially patient states that he would noticed the discomfort when he was breathing in cold air and did notice it several times where he got really agitated/excited over the last few days, he states that he recently lost his father over the weekend and though he has been under more stress recently now he is able to take care of himself.  Patient states that the feeling comes and goes, it is not usually exertional and up to about a week ago he was going to the gym frequently and many times was riding bikes for multiple miles without any discomfort.   He does not have any personal history of CAD, does have history of high blood pressure and high cholesterol as well as prediabetes.        Review of Systems   Constitutional:  Negative for appetite change, chills and fever.   HENT:  Negative for rhinorrhea and sore throat.    Eyes:  Negative for  photophobia and visual disturbance.   Respiratory:  Negative for cough and shortness of breath.    Cardiovascular:  Positive for chest pain. Negative for palpitations.   Gastrointestinal:  Positive for abdominal pain. Negative for diarrhea.   Genitourinary:  Negative for dysuria, frequency and urgency.   Skin:  Negative for rash.   Neurological:  Negative for dizziness and weakness.   All other systems reviewed and are negative.          Objective       ED Triage Vitals   Temperature Pulse Blood Pressure Respirations SpO2 Patient Position - Orthostatic VS   01/28/25 0240 01/28/25 0227 01/28/25 0227 01/28/25 0227 01/28/25 0227 01/28/25 0227   98.5 °F (36.9 °C) 82 137/83 18 98 % Lying      Temp Source Heart Rate Source BP Location FiO2 (%) Pain Score    01/28/25 0240 01/28/25 1043 01/28/25 0227 -- 01/28/25 0227    Oral Monitor Right arm  3      Vitals      Date and Time Temp Pulse SpO2 Resp BP Pain Score FACES Pain Rating User   01/28/25 2215 -- 79 96 % 20 -- -- -- MB   01/28/25 2100 -- 63 95 % 19 120/55 -- -- LK   01/28/25 1715 -- 85 97 % 20 124/58 -- -- MB   01/28/25 1708 -- 80 -- -- 124/58 -- -- LK   01/28/25 1310 -- 63 98 % 19 111/66 2 -- CAC   01/28/25 1130 -- 53 95 % 20 114/55 2 -- CAC   01/28/25 1043 -- 71 98 % 18 117/60 2 -- CAC   01/28/25 0928 -- -- -- -- -- 2 -- EW   01/28/25 0912 -- -- -- -- 113/61 -- -- EW   01/28/25 0700 -- 62 96 % 20 123/59 1 -- CAC   01/28/25 0500 -- 59 97 % 19 102/57 -- -- MG   01/28/25 0430 -- 62 96 % 20 115/64 -- -- MG   01/28/25 0330 -- 59 99 % 13 119/67 -- -- MG   01/28/25 0300 -- 64 100 % 13 124/63 -- -- MG   01/28/25 0250 -- -- -- -- -- 2 -- MG   01/28/25 0240 98.5 °F (36.9 °C) -- -- -- -- -- -- MG   01/28/25 0227 -- 82 98 % 18 137/83 3 -- MG            Physical Exam  Vitals and nursing note reviewed.   Constitutional:       Appearance: He is well-developed.   HENT:      Head: Normocephalic and atraumatic.      Right Ear: External ear normal.      Left Ear: External ear normal.    Eyes:      Conjunctiva/sclera: Conjunctivae normal.      Pupils: Pupils are equal, round, and reactive to light.   Neck:      Vascular: No JVD.      Trachea: No tracheal deviation.   Cardiovascular:      Rate and Rhythm: Normal rate and regular rhythm.      Heart sounds: Normal heart sounds. No murmur heard.     No friction rub. No gallop.   Pulmonary:      Effort: Pulmonary effort is normal. No respiratory distress.      Breath sounds: No stridor. No wheezing or rales.   Abdominal:      General: There is no distension.      Palpations: Abdomen is soft. There is no mass.      Tenderness: There is no abdominal tenderness. There is no guarding or rebound.   Musculoskeletal:         General: Normal range of motion.      Cervical back: Normal range of motion and neck supple.   Skin:     General: Skin is warm and dry.      Coloration: Skin is not pale.      Findings: No erythema or rash.   Neurological:      Mental Status: He is alert and oriented to person, place, and time.      Cranial Nerves: No cranial nerve deficit.         Results Reviewed       Procedure Component Value Units Date/Time    APTT six (6) hours after Heparin bolus/drip initiation or dosing change [[id number]]     Lab Status: No result Specimen: Blood     APTT six (6) hours after Heparin bolus/drip initiation or dosing change [[id number]]  (Abnormal) Collected: 01/28/25 2215    Lab Status: Final result Specimen: Blood from Arm, Left Updated: 01/28/25 2234     PTT 56 seconds     Fingerstick Glucose (POCT) [[id number]]  (Normal) Collected: 01/28/25 2212    Lab Status: Final result Specimen: Blood Updated: 01/28/25 2213     POC Glucose 109 mg/dl     APTT [[id number]]  (Abnormal) Collected: 01/28/25 1559    Lab Status: Final result Specimen: Blood from Arm, Left Updated: 01/28/25 1634     PTT 51 seconds     Fingerstick Glucose (POCT) [[id number]]  (Normal) Collected: 01/28/25 1554    Lab Status: Final result Specimen: Blood Updated: 01/28/25 1555     POC  Glucose 101 mg/dl     Hemoglobin A1C [[id number]]  (Abnormal) Collected: 01/28/25 0240    Lab Status: Final result Specimen: Blood from Arm, Right Updated: 01/28/25 1428     Hemoglobin A1C 6.4 %       mg/dl     Hemoglobin A1c w/EAG Estimation (Prechecked if no A1C within 90 days) [[id number]]  (Abnormal) Collected: 01/28/25 0722    Lab Status: Final result Specimen: Blood from Arm, Right Updated: 01/28/25 1335     Hemoglobin A1C 6.2 %       mg/dl     APTT six (6) hours after Heparin bolus/drip initiation or dosing change [[id number]]  (Abnormal) Collected: 01/28/25 1003    Lab Status: Final result Specimen: Blood from Arm, Right Updated: 01/28/25 1028     PTT 39 seconds     Fingerstick Glucose (POCT) [[id number]]  (Normal) Collected: 01/28/25 0904    Lab Status: Final result Specimen: Blood Updated: 01/28/25 0905     POC Glucose 117 mg/dl     HS Troponin I 4hr [[id number]]  (Abnormal) Collected: 01/28/25 0647    Lab Status: Final result Specimen: Blood from Arm, Right Updated: 01/28/25 0754     hs TnI 4hr 5,162 ng/L      Delta 4hr hsTnI 4,381 ng/L     Narrative:      verified by repeat analysis. : Repeat testing, per SOPverified by repeat analysis. : Check specimen for fibrin and specimen integrity.    Lipid Panel with Direct LDL reflex [[id number]]  (Abnormal) Collected: 01/28/25 0436    Lab Status: Final result Specimen: Blood from Arm, Right Updated: 01/28/25 0656     Cholesterol 118 mg/dL      Triglycerides 85 mg/dL      HDL, Direct 36 mg/dL      LDL Calculated 65 mg/dL     HS Troponin I 2hr [[id number]]  (Abnormal) Collected: 01/28/25 0436    Lab Status: Final result Specimen: Blood from Arm, Right Updated: 01/28/25 0507     hs TnI 2hr 1,320 ng/L      Delta 2hr hsTnI 539 ng/L     APTT [[id number]]  (Normal) Collected: 01/28/25 0335    Lab Status: Final result Specimen: Blood from Arm, Right Updated: 01/28/25 0352     PTT 29 seconds     Protime-INR [[id number]]  (Normal) Collected: 01/28/25 0335    Lab  Status: Final result Specimen: Blood from Arm, Right Updated: 01/28/25 0352     Protime 13.7 seconds      INR 1.00    Narrative:      INR Therapeutic Range    Indication                                             INR Range      Atrial Fibrillation                                               2.0-3.0  Hypercoagulable State                                    2.0.2.3  Left Ventricular Asist Device                            2.0-3.0  Mechanical Heart Valve                                  -    Aortic(with afib, MI, embolism, HF, LA enlargement,    and/or coagulopathy)                                     2.0-3.0 (2.5-3.5)     Mitral                                                             2.5-3.5  Prosthetic/Bioprosthetic Heart Valve               2.0-3.0  Venous thromboembolism (VTE: VT, PE        2.0-3.0    HS Troponin 0hr (reflex protocol) [[id number]]  (Abnormal) Collected: 01/28/25 0240    Lab Status: Final result Specimen: Blood from Arm, Right Updated: 01/28/25 0324     hs TnI 0hr 781 ng/L     Comprehensive metabolic panel [[id number]] Collected: 01/28/25 0240    Lab Status: Final result Specimen: Blood from Arm, Right Updated: 01/28/25 0319     Sodium 138 mmol/L      Potassium 4.1 mmol/L      Chloride 103 mmol/L      CO2 29 mmol/L      ANION GAP 6 mmol/L      BUN 20 mg/dL      Creatinine 1.14 mg/dL      Glucose 120 mg/dL      Calcium 9.2 mg/dL      AST 27 U/L      ALT 20 U/L      Alkaline Phosphatase 57 U/L      Total Protein 7.0 g/dL      Albumin 4.1 g/dL      Total Bilirubin 0.54 mg/dL      eGFR 64 ml/min/1.73sq m     Narrative:      [employer] guidelines for Chronic Kidney Disease (CKD):     Stage 1 with normal or high GFR (GFR > 90 mL/min/1.73 square meters)    Stage 2 Mild CKD (GFR = 60-89 mL/min/1.73 square meters)    Stage 3A Moderate CKD (GFR = 45-59 mL/min/1.73 square meters)    Stage 3B Moderate CKD (GFR = 30-44 mL/min/1.73 square meters)    Stage 4 Severe CKD (GFR = 15-29  mL/min/1.73 square meters)    Stage 5 End Stage CKD (GFR <15 mL/min/1.73 square meters)  Note: GFR calculation is accurate only with a steady state creatinine    Lipase [[id number]]  (Normal) Collected: 01/28/25 0240    Lab Status: Final result Specimen: Blood from Arm, Right Updated: 01/28/25 0319     Lipase 14 u/L     CBC and differential [[id number]] Collected: 01/28/25 0240    Lab Status: Final result Specimen: Blood from Arm, Right Updated: 01/28/25 0245     WBC 9.27 Thousand/uL      RBC 4.82 Million/uL      Hemoglobin 14.7 g/dL      Hematocrit 45.1 %      MCV 94 fL      MCH 30.5 pg      MCHC 32.6 g/dL      RDW 12.7 %      MPV 9.0 fL      Platelets 342 Thousands/uL      nRBC 0 /100 WBCs      Segmented % 58 %      Immature Grans % 1 %      Lymphocytes % 26 %      Monocytes % 12 %      Eosinophils Relative 3 %      Basophils Relative 0 %      Absolute Neutrophils 5.43 Thousands/µL      Absolute Immature Grans 0.05 Thousand/uL      Absolute Lymphocytes 2.38 Thousands/µL      Absolute Monocytes 1.14 Thousand/µL      Eosinophils Absolute 0.24 Thousand/µL      Basophils Absolute 0.03 Thousands/µL             CT chest without contrast   Final Interpretation by Royce Ga DO (01/28 8722)      No acute abnormality identified.      Findings consistent with fibrotic interstitial lung disease               Workstation performed: [id number]         XR chest portable   Final Interpretation by Richy Montgomery MD (01/28 6227)      Chronic interstitial lung disease without acute findings.            Workstation performed: [id number]             Procedures    ED Medication and Procedure Management   Prior to Admission Medications   Prescriptions Last Dose Informant Patient Reported? Taking?   Aspirin Buf,CaCarb-MgCarb-MgO, 81 MG TABS Unknown  Yes No   Sig: Take 1 tablet by mouth daily   Folic Acid 0.8 MG CAPS 1/27/2025  Yes Yes   Sig: every 24 hours   Christine-3 1000 MG CAPS 1/27/2025  Yes Yes   Sig: Take 2 g by mouth daily    Potassium 99 MG TABS 2025  Yes Yes   Sig: every 24 hours   aspirin 81 mg chewable tablet 2025 Morning  Yes Yes   Sig: Chew 81 mg daily   atorvastatin (LIPITOR) 20 mg tablet 2025  Yes Yes   Sig: Take 20 mg by mouth   clindamycin (CLEOCIN) 300 MG capsule 2025  Yes Yes   Sig: Take 2 capsules by mouth   fenofibrate (TRICOR) 145 mg tablet Unknown  Yes No   Sig: Take 1 tablet by mouth daily   lisinopril (ZESTRIL) 20 mg tablet 2025  Yes Yes   Sig: Take 20 mg by mouth   multivitamin (THERAGRAN) TABS 2025  Yes Yes   Sig: Take 1 tablet by mouth daily   omeprazole (PriLOSEC OTC) 20 MG tablet 2025  Yes Yes   Si mg every 24 hours      Facility-Administered Medications: None     Patient's Medications   Discharge Prescriptions    No medications on file     No discharge procedures on file.  ED SEPSIS DOCUMENTATION   Time reflects when diagnosis was documented in both MDM as applicable and the Disposition within this note       Time User Action Codes Description Comment    2025  3:45 AM Kristin Diggs [R07.9] Chest pain     2025  3:45 AM Kristin Diggs [R79.89] Elevated troponin     2025  3:45 AM Kristin Diggs Remove [R79.89] Elevated troponin     2025  3:45 AM Kristin Diggs [R79.89] Elevated troponin     2025  6:07 AM Kristin Diggs [I21.4] NSTEMI (non-ST elevated myocardial infarction) ([facility])                  Kristin Diggs DO  25 0354

## 2025-01-28 NOTE — CONSULTS
Consultation - Cardiology   Name: Brandon Thompson 69 y.o. male I MRN: [id number]  Unit/Bed#: ED 04 I Date of Admission: 1/28/2025   Date of Service: 1/28/2025 I Hospital Day: 0   Consult to cardiology  Consult performed by: JOSE Emerson  Consult ordered by: Mar Thompson PA-C      Physician Requesting Evaluation: César Gusman DO   Reason for Evaluation / Principal Problem: NSTEMI    Assessment & Plan  Chest pain  Patient presents with burning chest discomfort which began intermittently in December, which he associated with a viral illness.  Pain occurred in bilateral chest without associated symptoms, occurring with rest and activity, typically for less than 5 minutes, resolving without intervention.  Last night the chest burning lasted longer than 5 minutes prompting him to seek evaluation in the ER.     EKG on arrival with non specific ST changes, Troponin's elevated 720<1320<5162  - he was given 324 of aspirin and a heparin drip was initiated   CONNIE pending  Essential hypertension  Patient takes lisinopril outpatient,   Hyperlipidemia  Patient takes atorvastatin 20 mg daily, and tricor 145 mg   1/28/25 lipid panel: Tchol 118, LDL 65, HDL 36  Type 2 diabetes mellitus without complication, without long-term current use of insulin ([facility])    Lab Results   Component Value Date    HGBA1C 6.3 (H) 11/25/2015     Recommendations  Continue with heparin drip  Low threshold for starting nitroglycerin    History of Present Illness   Brandon Thompson is a 69 y.o. male  with a pmh of hypertension, hyperlipidemia who presents with burning chest discomfort.   Chest discomfort began intermittently in December, which he associated with a viral illness.  Pain occurs in bilateral chest without associated symptoms, occurring with rest and activity, typically for less than 5 minutes, resolving without intervention.  Last night the chest burning lasted longer than 5 minutes prompting him to seek evaluation in the  ER.    On arrival patient with elevated troponin, and non-specific ST changes. Patient given Aspirin 324 mg and Heparin drip initiated   Patient denies chest pain, chest burning, lightheadedness, dizziness at this time. Awaiting transfer to [facility] for [facility]        Review of Systems   Constitutional:  Negative for fatigue and fever.   Respiratory:  Positive for cough and chest tightness. Negative for shortness of breath.    Cardiovascular:  Negative for chest pain, palpitations and leg swelling.   Gastrointestinal:  Negative for anal bleeding, blood in stool, diarrhea, nausea and vomiting.   Genitourinary:  Negative for hematuria.   Musculoskeletal:  Positive for back pain, myalgias and neck pain.   Neurological:  Negative for dizziness, weakness, light-headedness, numbness and headaches.     I have reviewed the patient's PMH, PSH, Social History, Family History, Meds, and Allergies    Objective :  Temp:  [98.5 °F (36.9 °C)] 98.5 °F (36.9 °C)  HR:  [59-82] 62  BP: (102-137)/(57-83) 123/59  Resp:  [13-20] 20  SpO2:  [96 %-100 %] 96 %  O2 Device: None (Room air)  Orthostatic Blood Pressures      Flowsheet Row Most Recent Value   Blood Pressure 123/59 filed at 01/28/2025 0700   Patient Position - Orthostatic VS Lying filed at 01/28/2025 0700          First Weight: Weight - Scale: 95.3 kg (210 lb) (01/28/25 0636)  Vitals:    01/28/25 0636   Weight: 95.3 kg (210 lb)       Physical Exam  Constitutional:       Appearance: Normal appearance.   HENT:      Head: Normocephalic and atraumatic.      Nose: Nose normal.      Mouth/Throat:      Mouth: Mucous membranes are moist.   Cardiovascular:      Rate and Rhythm: Normal rate and regular rhythm.   Pulmonary:      Effort: Pulmonary effort is normal.      Breath sounds: Normal breath sounds.   Abdominal:      General: Bowel sounds are normal.      Palpations: Abdomen is soft.   Musculoskeletal:      Right lower leg: No edema.      Left lower leg: No edema.   Skin:     General:  "Skin is warm.      Capillary Refill: Capillary refill takes less than 2 seconds.   Neurological:      General: No focal deficit present.      Mental Status: He is alert and oriented to person, place, and time. Mental status is at baseline.   Psychiatric:         Mood and Affect: Mood normal.         Thought Content: Thought content normal.         Lab Results: I have reviewed the following results:Troponin,BNP:  .     01/28/25  0240 01/28/25  0436   HSTNI0 781*  --    HSTNI2  --  1,320*      Results from last 7 days   Lab Units 01/28/25  0240   WBC Thousand/uL 9.27   HEMOGLOBIN g/dL 14.7   HEMATOCRIT % 45.1   PLATELETS Thousands/uL 342     Results from last 7 days   Lab Units 01/28/25  0240   POTASSIUM mmol/L 4.1   CHLORIDE mmol/L 103   CO2 mmol/L 29   BUN mg/dL 20   CREATININE mg/dL 1.14   CALCIUM mg/dL 9.2     Results from last 7 days   Lab Units 01/28/25  0335   INR  1.00   PTT seconds 29     Lab Results   Component Value Date    HGBA1C 6.3 (H) 11/25/2015     No results found for: \"CKTOTAL\", \"CKMB\", \"CKMBINDEX\", \"TROPONINI\"    Imaging Results Review: I reviewed radiology reports from this admission including: chest xray and CT chest.  Other Study Results Review: EKG was reviewed.     VTE Prophylaxis: VTE covered by:  heparin (porcine), Intravenous, 12 Units/kg/hr at 01/28/25 0404  heparin (porcine), Intravenous  heparin (porcine), Intravenous         "

## 2025-01-28 NOTE — TRANSPORTATION MEDICAL NECESSITY
"Section I - General Information    Name of Patient: Brandon Thompson                 : 1955    Medicare #: [id number]  Transport Date: 25 (PCS is valid for round trips on this date and for all repetitive trips in the 60-day range as noted below.)  Origin:  [facility] EMERGENCY DEPARTMENT                                                         Destination: [facility]   Is the pt's stay covered under Medicare Part A (PPS/DRG)   []     Closest appropriate facility? If no, why is transport to more distant facility required? Yes  If hospice pt, is this transport related to pt's terminal illness? NA       Section II - Medical Necessity Questionnaire  Ambulance transportation is medically necessary only if other means of transport are contraindicated or would be potentially harmful to the patient. To meet this requirement, the patient must either be \"bed confined\" or suffer from a condition such that transport by means other than ambulance is contraindicated by the patient's condition. The following questions must be answered by the medical professional signing below for this form to be valid:    1)  Describe the MEDICAL CONDITION (physical and/or mental) of this patient AT THE TIME OF AMBULANCE TRANSPORT that requires the patient to be transported in an ambulance and why transport by other means is contraindicated by the patient's condition:Transfer for MI. Level of care needed not available at [facility]     2) Is the patient \"bed confined\" as defined below?     No  To be \"be confined\" the patient must satisfy all three of the following conditions: (1) unable to get up from bed without Assistance; AND (2) unable to ambulate; AND (3) unable to sit in a chair or wheelchair.    3) Can this patient safely be transported by car or wheelchair van (i.e., seated during transport without a medical attendant or monitoring)?   No    4) In addition to completing questions 1-3 above, please " check any of the following conditions that apply*:   *Note: supporting documentation for any boxes checked must be maintained in the patient's medical records.  If hosp-hosp transfer, describe services needed at 2nd facility not available at 1st facility?   Medical attendant required   Cardiac monitoring required en route       Section III - Signature of Physician or Healthcare Professional  I certify that the above information is true and correct based on my evaluation of this patient, and represent that the patient requires transport by ambulance and that other forms of transport are contraindicated. I understand that this information will be used by the Centers for Medicare and Medicaid Services (CMS) to support the determination of medical necessity for ambulance services, and I represent that I have personal knowledge of the patient's condition at time of transport.    []  If this box is checked, I also certify that the patient is physically or mentally incapable of signing the ambulance service's claim and that the institution with which I am affiliated has furnished care, services, or assistance to the patient.    My signature below is made on behalf of the patient pursuant to 42 CFR §424.36(b)(4). In accordance with 42 CFR §424.37, the specific reason(s) that the patient is physically or mentally incapable of signing the claim form is as follows: .      Signature of Physician* or Healthcare Professional______________________________________________________________  Signature Date 01/28/25 (For scheduled repetitive transports, this form is not valid for transports performed more than 60 days after this date)    Printed Name & Credentials of Physician or Healthcare Professional (MD, DO, RN, etc.Maria Antonia STANTON, SANTIAGO ___________  *Form must be signed by patient's attending physician for scheduled, repetitive transports. For non-repetitive, unscheduled ambulance transports, if unable to obtain the  signature of the attending physician, any of the following may sign (choose appropriate option below)  [] Physician Assistant []  Clinical Nurse Specialist []  Registered Nurse  []  Nurse Practitioner  [x] Discharge Planner

## 2025-01-29 ENCOUNTER — APPOINTMENT (EMERGENCY)
Dept: NON INVASIVE DIAGNOSTICS | Facility: HOSPITAL | Age: 70
End: 2025-01-29
Payer: COMMERCIAL

## 2025-01-29 ENCOUNTER — HOSPITAL ENCOUNTER (INPATIENT)
Facility: HOSPITAL | Age: 70
LOS: 2 days | Discharge: HOME | End: 2025-01-31
Attending: INTERNAL MEDICINE
Payer: COMMERCIAL

## 2025-01-29 VITALS
SYSTOLIC BLOOD PRESSURE: 109 MMHG | TEMPERATURE: 98.5 F | DIASTOLIC BLOOD PRESSURE: 60 MMHG | WEIGHT: 223.33 LBS | OXYGEN SATURATION: 96 % | BODY MASS INDEX: 29.6 KG/M2 | HEIGHT: 73 IN | RESPIRATION RATE: 21 BRPM | HEART RATE: 67 BPM

## 2025-01-29 DIAGNOSIS — I10 ESSENTIAL HYPERTENSION: ICD-10-CM

## 2025-01-29 DIAGNOSIS — I21.4 NSTEMI (NON-ST ELEVATED MYOCARDIAL INFARCTION) (HCC): Primary | ICD-10-CM

## 2025-01-29 DIAGNOSIS — E78.5 HYPERLIPIDEMIA: ICD-10-CM

## 2025-01-29 LAB
ANION GAP SERPL CALCULATED.3IONS-SCNC: 6 MMOL/L (ref 4–13)
AORTIC ROOT: 3.5 CM
AORTIC VALVE MEAN VELOCITY: 12.6 M/S
APTT PPP: 119 SECONDS (ref 23–34)
APTT PPP: 33 SECONDS (ref 23–34)
APTT PPP: >210 SECONDS (ref 23–34)
AV AREA BY CONTINUOUS VTI: 1.9 CM2
AV LVOT MEAN GRADIENT: 1 MMHG
AV LVOT PEAK GRADIENT: 2.1 MMHG
AV MEAN PRESS GRAD SYS DOP V1V2: 7 MMHG
AV ORIFICE AREA US: 1.93 CM2
AV PEAK GRADIENT: 12.3 MMHG
AV VELOCITY RATIO: 0.47
AV VMAX SYS DOP: 1.8 M/S
BSA FOR ECHO PROCEDURE: 2.2 M2
BUN SERPL-MCNC: 19 MG/DL (ref 5–25)
CALCIUM SERPL-MCNC: 9.3 MG/DL (ref 8.4–10.2)
CHLORIDE SERPL-SCNC: 103 MMOL/L (ref 96–108)
CO2 SERPL-SCNC: 28 MMOL/L (ref 21–32)
CREAT SERPL-MCNC: 1.16 MG/DL (ref 0.6–1.3)
DOP CALC AO VTI: 35.9 CM
DOP CALC LVOT AREA: 4.15
DOP CALC LVOT CARDIAC OUTPUT: 4.8 L/MIN
DOP CALC LVOT DIAMETER: 2.3 CM
DOP CALC LVOT PEAK VEL VTI: 16.7 CM
DOP CALC LVOT PEAK VEL: 72.5 M/S
DOP CALC LVOT STROKE INDEX: 31.4 ML/M2
DOP CALC LVOT STROKE VOLUME: 69.35
DOP CALC MV VTI: 19 CM
E WAVE DECELERATION TIME: 210 MS
E/A RATIO: 0.79
ERYTHROCYTE [DISTWIDTH] IN BLOOD BY AUTOMATED COUNT: 13.1 % (ref 11.6–15.1)
FRACTIONAL SHORTENING: 26 (ref 28–44)
GFR SERPL CREATININE-BSD FRML MDRD: 63 ML/MIN/1.73SQ M
GLUCOSE SERPL-MCNC: 102 MG/DL (ref 65–140)
GLUCOSE SERPL-MCNC: 104 MG/DL (ref 65–140)
GLUCOSE SERPL-MCNC: 105 MG/DL (ref 65–140)
GLUCOSE SERPL-MCNC: 119 MG/DL (ref 65–140)
GLUCOSE SERPL-MCNC: 88 MG/DL (ref 65–140)
HCT VFR BLD AUTO: 45.1 % (ref 36.5–49.3)
HGB BLD-MCNC: 15 G/DL (ref 12–17)
INTERVENTRICULAR SEPTUM IN DIASTOLE (PARASTERNAL SHORT AXIS VIEW): 1.2 CM
INTERVENTRICULAR SEPTUM: 1.2 CM (ref 0.6–1.1)
LA/AORTA RATIO 2D: 1.11
LAAS-AP2: 18.2 CM2
LAAS-AP4: 15.9 CM2
LEFT ATRIUM SIZE: 3.9 CM
LEFT ATRIUM VOLUME (MOD BIPLANE): 46 ML
LEFT ATRIUM VOLUME INDEX (MOD BIPLANE): 20.9 ML/M2
LEFT INTERNAL DIMENSION IN SYSTOLE: 3.9 CM (ref 2.1–4)
LEFT VENTRICLE DIASTOLIC VOLUME (MOD BIPLANE): 113 ML
LEFT VENTRICLE DIASTOLIC VOLUME INDEX (MOD BIPLANE): 51.4 ML/M2
LEFT VENTRICLE SYSTOLIC VOLUME (MOD BIPLANE): 61 ML
LEFT VENTRICLE SYSTOLIC VOLUME INDEX (MOD BIPLANE): 27.7 ML/M2
LEFT VENTRICULAR INTERNAL DIMENSION IN DIASTOLE: 5.3 CM (ref 3.5–6)
LEFT VENTRICULAR POSTERIOR WALL IN END DIASTOLE: 1.1 CM
LEFT VENTRICULAR STROKE VOLUME: 68 ML
LV EF BIPLANE MOD: 46 %
LV EF US.2D.A4C+ESTIMATED: 35 %
LVSV (TEICH): 68 ML
MCH RBC QN AUTO: 31.4 PG (ref 26.8–34.3)
MCHC RBC AUTO-ENTMCNC: 33.3 G/DL (ref 31.4–37.4)
MCV RBC AUTO: 94 FL (ref 82–98)
MV E'TISSUE VEL-LAT: 9 CM/S
MV E'TISSUE VEL-SEP: 8 CM/S
MV MEAN GRADIENT: 1 MMHG
MV PEAK A VEL: 0.72 M/S
MV PEAK E VEL: 57 CM/S
MV PEAK GRADIENT: 2 MMHG
MV STENOSIS PRESSURE HALF TIME: 62 MS
MV VALVE AREA BY CONTINUITY EQUATION: 3.65 CM2
MV VALVE AREA P 1/2 METHOD: 3.55
PLATELET # BLD AUTO: 322 THOUSANDS/UL (ref 149–390)
PMV BLD AUTO: 9.2 FL (ref 8.9–12.7)
POTASSIUM SERPL-SCNC: 4.4 MMOL/L (ref 3.5–5.3)
RBC # BLD AUTO: 4.78 MILLION/UL (ref 3.88–5.62)
RIGHT VENTRICLE ID DIMENSION: 4.2 CM
SL CV LV EF: 45
SL CV PED ECHO LEFT VENTRICLE DIASTOLIC VOLUME (MOD BIPLANE) 2D: 135 ML
SL CV PED ECHO LEFT VENTRICLE SYSTOLIC VOLUME (MOD BIPLANE) 2D: 68 ML
SODIUM SERPL-SCNC: 137 MMOL/L (ref 135–147)
TRICUSPID ANNULAR PLANE SYSTOLIC EXCURSION: 1.7 CM
WBC # BLD AUTO: 8.09 THOUSAND/UL (ref 4.31–10.16)

## 2025-01-29 PROCEDURE — 96366 THER/PROPH/DIAG IV INF ADDON: CPT

## 2025-01-29 PROCEDURE — NC001 PR NO CHARGE

## 2025-01-29 PROCEDURE — 80048 BASIC METABOLIC PNL TOTAL CA: CPT | Performed by: INTERNAL MEDICINE

## 2025-01-29 PROCEDURE — 82948 REAGENT STRIP/BLOOD GLUCOSE: CPT

## 2025-01-29 PROCEDURE — 99223 1ST HOSP IP/OBS HIGH 75: CPT

## 2025-01-29 PROCEDURE — 85730 THROMBOPLASTIN TIME PARTIAL: CPT | Performed by: EMERGENCY MEDICINE

## 2025-01-29 PROCEDURE — C8929 TTE W OR WO FOL WCON,DOPPLER: HCPCS

## 2025-01-29 PROCEDURE — 85027 COMPLETE CBC AUTOMATED: CPT | Performed by: INTERNAL MEDICINE

## 2025-01-29 PROCEDURE — 99214 OFFICE O/P EST MOD 30 MIN: CPT | Performed by: INTERNAL MEDICINE

## 2025-01-29 PROCEDURE — 93306 TTE W/DOPPLER COMPLETE: CPT | Performed by: INTERNAL MEDICINE

## 2025-01-29 PROCEDURE — 85730 THROMBOPLASTIN TIME PARTIAL: CPT

## 2025-01-29 PROCEDURE — 36415 COLL VENOUS BLD VENIPUNCTURE: CPT | Performed by: EMERGENCY MEDICINE

## 2025-01-29 RX ORDER — HEPARIN SODIUM 10000 [USP'U]/100ML
3-20 INJECTION, SOLUTION INTRAVENOUS
Status: DISCONTINUED | OUTPATIENT
Start: 2025-01-29 | End: 2025-01-29

## 2025-01-29 RX ORDER — NITROGLYCERIN 0.4 MG/1
0.4 TABLET SUBLINGUAL
Status: CANCELLED | OUTPATIENT
Start: 2025-01-29

## 2025-01-29 RX ORDER — HEPARIN SODIUM 10000 [USP'U]/100ML
3-20 INJECTION, SOLUTION INTRAVENOUS
Status: DISCONTINUED | OUTPATIENT
Start: 2025-01-29 | End: 2025-01-30

## 2025-01-29 RX ORDER — HEPARIN SODIUM 10000 [USP'U]/100ML
3-20 INJECTION, SOLUTION INTRAVENOUS
Status: CANCELLED | OUTPATIENT
Start: 2025-01-29

## 2025-01-29 RX ORDER — ACETAMINOPHEN 325 MG/1
650 TABLET ORAL EVERY 6 HOURS PRN
Status: CANCELLED | OUTPATIENT
Start: 2025-01-29

## 2025-01-29 RX ORDER — ATORVASTATIN CALCIUM 80 MG/1
80 TABLET, FILM COATED ORAL
Status: DISCONTINUED | OUTPATIENT
Start: 2025-01-30 | End: 2025-01-31 | Stop reason: HOSPADM

## 2025-01-29 RX ORDER — ASPIRIN 81 MG/1
81 TABLET, CHEWABLE ORAL DAILY
Status: CANCELLED | OUTPATIENT
Start: 2025-01-30

## 2025-01-29 RX ORDER — INSULIN LISPRO 100 [IU]/ML
1-6 INJECTION, SOLUTION INTRAVENOUS; SUBCUTANEOUS
Status: DISCONTINUED | OUTPATIENT
Start: 2025-01-29 | End: 2025-01-31 | Stop reason: HOSPADM

## 2025-01-29 RX ORDER — HEPARIN SODIUM 1000 [USP'U]/ML
4000 INJECTION, SOLUTION INTRAVENOUS; SUBCUTANEOUS EVERY 6 HOURS PRN
Status: DISCONTINUED | OUTPATIENT
Start: 2025-01-29 | End: 2025-01-30

## 2025-01-29 RX ORDER — ATORVASTATIN CALCIUM 40 MG/1
80 TABLET, FILM COATED ORAL
Status: CANCELLED | OUTPATIENT
Start: 2025-01-30

## 2025-01-29 RX ORDER — HEPARIN SODIUM 1000 [USP'U]/ML
2000 INJECTION, SOLUTION INTRAVENOUS; SUBCUTANEOUS EVERY 6 HOURS PRN
Status: CANCELLED | OUTPATIENT
Start: 2025-01-29

## 2025-01-29 RX ORDER — HEPARIN SODIUM 1000 [USP'U]/ML
4000 INJECTION, SOLUTION INTRAVENOUS; SUBCUTANEOUS EVERY 6 HOURS PRN
Status: CANCELLED | OUTPATIENT
Start: 2025-01-29

## 2025-01-29 RX ORDER — INSULIN LISPRO 100 [IU]/ML
1-6 INJECTION, SOLUTION INTRAVENOUS; SUBCUTANEOUS
Status: CANCELLED | OUTPATIENT
Start: 2025-01-30

## 2025-01-29 RX ORDER — PANTOPRAZOLE SODIUM 20 MG/1
20 TABLET, DELAYED RELEASE ORAL
Status: CANCELLED | OUTPATIENT
Start: 2025-01-30

## 2025-01-29 RX ORDER — HEPARIN SODIUM 1000 [USP'U]/ML
2000 INJECTION, SOLUTION INTRAVENOUS; SUBCUTANEOUS EVERY 6 HOURS PRN
Status: DISCONTINUED | OUTPATIENT
Start: 2025-01-29 | End: 2025-01-30

## 2025-01-29 RX ORDER — FOLIC ACID 1 MG/1
1 TABLET ORAL DAILY
Status: CANCELLED | OUTPATIENT
Start: 2025-01-30

## 2025-01-29 RX ORDER — PANTOPRAZOLE SODIUM 20 MG/1
20 TABLET, DELAYED RELEASE ORAL
Status: DISCONTINUED | OUTPATIENT
Start: 2025-01-30 | End: 2025-01-31 | Stop reason: HOSPADM

## 2025-01-29 RX ORDER — LISINOPRIL 20 MG/1
20 TABLET ORAL DAILY
Status: DISCONTINUED | OUTPATIENT
Start: 2025-01-30 | End: 2025-01-30

## 2025-01-29 RX ORDER — HEPARIN SODIUM 1000 [USP'U]/ML
3900 INJECTION, SOLUTION INTRAVENOUS; SUBCUTANEOUS EVERY 6 HOURS PRN
Status: CANCELLED | OUTPATIENT
Start: 2025-01-29

## 2025-01-29 RX ORDER — HEPARIN SODIUM 1000 [USP'U]/ML
1950 INJECTION, SOLUTION INTRAVENOUS; SUBCUTANEOUS EVERY 6 HOURS PRN
Status: CANCELLED | OUTPATIENT
Start: 2025-01-29

## 2025-01-29 RX ORDER — HEPARIN SODIUM 1000 [USP'U]/ML
4000 INJECTION, SOLUTION INTRAVENOUS; SUBCUTANEOUS EVERY 6 HOURS PRN
Status: DISCONTINUED | OUTPATIENT
Start: 2025-01-29 | End: 2025-01-29 | Stop reason: HOSPADM

## 2025-01-29 RX ORDER — INSULIN LISPRO 100 [IU]/ML
1-6 INJECTION, SOLUTION INTRAVENOUS; SUBCUTANEOUS
Status: DISCONTINUED | OUTPATIENT
Start: 2025-01-30 | End: 2025-01-31 | Stop reason: HOSPADM

## 2025-01-29 RX ORDER — METOPROLOL TARTRATE 25 MG/1
25 TABLET, FILM COATED ORAL EVERY 12 HOURS SCHEDULED
Status: CANCELLED | OUTPATIENT
Start: 2025-01-29

## 2025-01-29 RX ORDER — METOPROLOL TARTRATE 25 MG/1
25 TABLET, FILM COATED ORAL EVERY 12 HOURS SCHEDULED
Status: DISCONTINUED | OUTPATIENT
Start: 2025-01-29 | End: 2025-01-31

## 2025-01-29 RX ORDER — INSULIN LISPRO 100 [IU]/ML
1-6 INJECTION, SOLUTION INTRAVENOUS; SUBCUTANEOUS
Status: CANCELLED | OUTPATIENT
Start: 2025-01-29

## 2025-01-29 RX ORDER — HEPARIN SODIUM 10000 [USP'U]/100ML
3-20 INJECTION, SOLUTION INTRAVENOUS
Status: DISCONTINUED | OUTPATIENT
Start: 2025-01-29 | End: 2025-01-29 | Stop reason: HOSPADM

## 2025-01-29 RX ORDER — NITROGLYCERIN 0.4 MG/1
0.4 TABLET SUBLINGUAL
Status: DISCONTINUED | OUTPATIENT
Start: 2025-01-29 | End: 2025-01-31 | Stop reason: HOSPADM

## 2025-01-29 RX ORDER — ASPIRIN 81 MG/1
81 TABLET, CHEWABLE ORAL DAILY
Status: DISCONTINUED | OUTPATIENT
Start: 2025-01-30 | End: 2025-01-31 | Stop reason: HOSPADM

## 2025-01-29 RX ORDER — ACETAMINOPHEN 325 MG/1
650 TABLET ORAL EVERY 6 HOURS PRN
Status: DISCONTINUED | OUTPATIENT
Start: 2025-01-29 | End: 2025-01-31 | Stop reason: HOSPADM

## 2025-01-29 RX ORDER — HEPARIN SODIUM 1000 [USP'U]/ML
2000 INJECTION, SOLUTION INTRAVENOUS; SUBCUTANEOUS EVERY 6 HOURS PRN
Status: DISCONTINUED | OUTPATIENT
Start: 2025-01-29 | End: 2025-01-29 | Stop reason: HOSPADM

## 2025-01-29 RX ORDER — LISINOPRIL 20 MG/1
20 TABLET ORAL DAILY
Status: CANCELLED | OUTPATIENT
Start: 2025-01-30

## 2025-01-29 RX ORDER — FOLIC ACID 1 MG/1
1 TABLET ORAL DAILY
Status: DISCONTINUED | OUTPATIENT
Start: 2025-01-30 | End: 2025-01-31 | Stop reason: HOSPADM

## 2025-01-29 RX ADMIN — ATORVASTATIN CALCIUM 80 MG: 40 TABLET, FILM COATED ORAL at 17:04

## 2025-01-29 RX ADMIN — HEPARIN SODIUM 17 UNITS/KG/HR: 10000 INJECTION, SOLUTION INTRAVENOUS at 06:19

## 2025-01-29 RX ADMIN — PANTOPRAZOLE SODIUM 20 MG: 20 TABLET, DELAYED RELEASE ORAL at 06:15

## 2025-01-29 RX ADMIN — METOPROLOL TARTRATE 25 MG: 25 TABLET, FILM COATED ORAL at 22:24

## 2025-01-29 RX ADMIN — LISINOPRIL 20 MG: 20 TABLET ORAL at 09:36

## 2025-01-29 RX ADMIN — FOLIC ACID 1 MG: 1 TABLET ORAL at 09:36

## 2025-01-29 RX ADMIN — METOPROLOL TARTRATE 25 MG: 25 TABLET, FILM COATED ORAL at 09:36

## 2025-01-29 RX ADMIN — PERFLUTREN 0.8 ML/MIN: 6.52 INJECTION, SUSPENSION INTRAVENOUS at 08:32

## 2025-01-29 RX ADMIN — ASPIRIN 81 MG CHEWABLE TABLET 81 MG: 81 TABLET CHEWABLE at 09:35

## 2025-01-29 RX ADMIN — HEPARIN SODIUM 4000 UNITS: 1000 INJECTION INTRAVENOUS; SUBCUTANEOUS at 23:31

## 2025-01-29 RX ADMIN — HEPARIN SODIUM 20 UNITS/KG/HR: 10000 INJECTION, SOLUTION INTRAVENOUS at 01:49

## 2025-01-29 NOTE — ASSESSMENT & PLAN NOTE
Patient presented with about 3-4 weeks of nonradiating, burning chest pain  Trops elevated in ED   Trend: 365 - 7731 - 6161  ED discussed symptoms, troponins, and EKGs with cardiology.  It was reccommended patient should be transferred to [facility].  Accepted at [facility] but currently at capacity.   Awaiting bed availability and transfer to [place].   In ED, received aspirin 324 mg. Will continue 81 mg, daily.  Lipid panel WNL, hgba1c 6.2  Continue heparin drip   Control pain/discomfort with PRN nitroglycerin.   Continue telemetry.   Cardiology following, appreciate recs   Patient is stable for transfer to [place]

## 2025-01-29 NOTE — Clinical Note
Received to CCL holding area by stretcher on telemetry for planned study.  Aware of planned procedure and asking questions freely.  Pleasant and cooperative.  Without current complaints.  Spoke with MD Carl, fellow and informed consent obtained for procedure

## 2025-01-29 NOTE — PROGRESS NOTES
"Progress Note - Hospitalist   Name: Brandon Thompson 69 y.o. male I MRN: [id number]  Unit/Bed#: ED 04 I Date of Admission: 1/28/2025   Date of Service: 1/29/2025 I Hospital Day: 0    Assessment & Plan  Chest pain  Patient presented with about 3-4 weeks of nonradiating, burning chest pain  Trops elevated in ED   Trend: 957 - 0176 - 1505  ED discussed symptoms, troponins, and EKGs with cardiology.  It was reccommended patient should be transferred to [facility].  Accepted at [facility] but currently at capacity.   Awaiting bed availability and transfer to [facility].   In ED, received aspirin 324 mg. Will continue 81 mg, daily.  Lipid panel WNL, hgba1c 6.2  Continue heparin drip   Control pain/discomfort with PRN nitroglycerin.   Continue telemetry.   Cardiology following, appreciate recs   Abnormal CT scan  In ED, CT chest without contrast completed with findings of \"No acute abnormality identified. 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  Essential hypertension  Will continue home regimen of Lisinopril 20 mg, daily.  Continue to monitor vital signs, per unit protocol.   GERD (gastroesophageal reflux disease)  Will continue home regimen of omeprazole 20 mg, daily.  Type 2 diabetes mellitus without complication, without long-term current use of insulin ([facility])    Lab Results   Component Value Date    HGBA1C 6.2 (H) 01/28/2025     Originally, patient explains that he was on metformin 500 mg, daily.  However, no longer taking this medication.    Hgb A1c 6.2  Sliding scale   Carb controlled diet   Hyperlipidemia  Will continue home regimen of atorvastatin 20 mg, daily.   Lipid panel WNL    VTE Pharmacologic Prophylaxis: VTE Score: 3 Moderate Risk (Score 3-4) - Pharmacological DVT Prophylaxis Ordered: heparin drip.    Mobility:      [facility] Goal achieved. Continue to encourage appropriate mobility.    Patient Centered Rounds: I performed " bedside rounds with nursing staff today.   Discussions with Specialists or Other Care Team Provider: Cardiology     Education and Discussions with Family / Patient: Updated  (wife) at bedside.    Current Length of Stay: 0 day(s)  Current Patient Status: Emergency   Certification Statement: The patient will continue to require additional inpatient hospital stay due to pending transfer to [place]  Discharge Plan: Anticipate discharge later today or tomorrow to [facility]    Code Status: No Order    Subjective   Seen and examined.  No acute events overnight.  Patient is chest pain free.  He has had no further episodes of chest discomfort or pain.  Patient is very eager for discharge.  He is planning his father's  which is scheduled for Monday.  Patient and wife are upset they are still waiting for transfer to another facility. Discussed with cardiology who tried to expedite the process. Patient notified that he is next on transfer list.     Objective :  HR:  [53-85] 81  BP: (106-130)/(55-66) 106/55  Resp:  [18-20] 18  SpO2:  [92 %-98 %] 94 %  O2 Device: None (Room air)    Body mass index is 27.71 kg/m².     Input and Output Summary (last 24 hours):   No intake or output data in the 24 hours ending 25 0705    Physical Exam  Constitutional:       General: He is not in acute distress.     Appearance: He is not toxic-appearing.   HENT:      Head: Normocephalic and atraumatic.      Nose: Nose normal.      Mouth/Throat:      Mouth: Mucous membranes are moist.   Eyes:      Conjunctiva/sclera: Conjunctivae normal.   Cardiovascular:      Heart sounds: Normal heart sounds.   Pulmonary:      Breath sounds: Normal breath sounds. No wheezing, rhonchi or rales.   Abdominal:      General: Bowel sounds are normal.      Palpations: Abdomen is soft.   Musculoskeletal:      Right lower leg: No edema.      Left lower leg: No edema.   Skin:     General: Skin is warm and dry.   Neurological:      Mental Status: Mental  status is at baseline.   Psychiatric:         Mood and Affect: Mood normal.       Lines/Drains:        Lab Results: I have reviewed the following results:   Results from last 7 days   Lab Units 01/29/25  0618 01/28/25  0240   WBC Thousand/uL 8.09 9.27   HEMOGLOBIN g/dL 15.0 14.7   HEMATOCRIT % 45.1 45.1   PLATELETS Thousands/uL 322 342   SEGS PCT %  --  58   LYMPHO PCT %  --  26   MONO PCT %  --  12   EOS PCT %  --  3     Results from last 7 days   Lab Units 01/29/25  0618 01/28/25  0240   SODIUM mmol/L 137 138   POTASSIUM mmol/L 4.4 4.1   CHLORIDE mmol/L 103 103   CO2 mmol/L 28 29   BUN mg/dL 19 20   CREATININE mg/dL 1.16 1.14   ANION GAP mmol/L 6 6   CALCIUM mg/dL 9.3 9.2   ALBUMIN g/dL  --  4.1   TOTAL BILIRUBIN mg/dL  --  0.54   ALK PHOS U/L  --  57   ALT U/L  --  20   AST U/L  --  27   GLUCOSE RANDOM mg/dL 105 120     Results from last 7 days   Lab Units 01/28/25  0335   INR  1.00     Results from last 7 days   Lab Units 01/28/25  2212 01/28/25  1554 01/28/25  0904   POC GLUCOSE mg/dl 109 101 117     Results from last 7 days   Lab Units 01/28/25  0722 01/28/25  0240   HEMOGLOBIN A1C % 6.2* 6.4*           Recent Cultures (last 7 days):         Imaging Results Review: I reviewed radiology reports from this admission including: CT chest.  Other Study Results Review: No additional pertinent studies reviewed.    Last 24 Hours Medication List:     Current Facility-Administered Medications:     acetaminophen (TYLENOL) tablet 650 mg, Q6H PRN    aspirin chewable tablet 81 mg, Daily    atorvastatin (LIPITOR) tablet 80 mg, Daily With Dinner    folic acid (FOLVITE) tablet 1 mg, Daily    heparin (porcine) 25,000 units in 0.45% NaCl 250 mL infusion (premix), Titrated, Last Rate: 17 Units/kg/hr (01/29/25 0619)    heparin (porcine) injection 1,950 Units, Q6H PRN    heparin (porcine) injection 3,900 Units, Q6H PRN    insulin lispro (HumALOG/ADMELOG) 100 units/mL subcutaneous injection 1-6 Units, TID AC **AND** Fingerstick  Glucose (POCT), TID AC    insulin lispro (HumALOG/ADMELOG) 100 units/mL subcutaneous injection 1-6 Units, HS    lisinopril (ZESTRIL) tablet 20 mg, Daily    metoprolol tartrate (LOPRESSOR) tablet 25 mg, Q12H HANNAH    nitroglycerin (NITROSTAT) SL tablet 0.4 mg, Q5 Min PRN    pantoprazole (PROTONIX) EC tablet 20 mg, Early Morning    Administrative Statements   Today, Patient Was Seen By: Lynnette Samriento PA-C    **Please Note: This note may have been constructed using a voice recognition system.**

## 2025-01-29 NOTE — ASSESSMENT & PLAN NOTE
Lab Results   Component Value Date    HGBA1C 6.2 (H) 01/28/2025     Originally, patient explains that he was on metformin 500 mg, daily.  However, no longer taking this medication.    Hgb A1c 6.2  Sliding scale   Carb controlled diet

## 2025-01-29 NOTE — Clinical Note
Post procedure Avelino is fully awake alert responsive cooperative and appropriate.  Speech clear.  FSC, KNOX without current complaints.  Readied for transfer.  Restrictions explained and receptive.  Avelino spoke with DO Marshal and findings discussed, questions answered.  Readied for transfer.

## 2025-01-29 NOTE — ASSESSMENT & PLAN NOTE
Patient takes atorvastatin 20 mg daily, and tricor 145 mg - atorvastatin increased   1/28/25 lipid panel: Tchol 118, LDL 65, HDL 36

## 2025-01-29 NOTE — ASSESSMENT & PLAN NOTE
"In ED, CT chest without contrast completed with findings of \"No acute abnormality identified. 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  "

## 2025-01-29 NOTE — PROGRESS NOTES
Progress Note - Cardiology   Name: Brandon Thompson 69 y.o. male I MRN: [id number]  Unit/Bed#: ED 04 I Date of Admission: 1/28/2025   Date of Service: 1/29/2025 I Hospital Day: 0    Assessment & Plan  NSTEMI (non-ST elevated myocardial infarction) (HCC)  Patient presents with burning chest discomfort which began intermittently in December, which he associated with a viral illness.  Pain occurred in bilateral chest without associated symptoms, occurring with rest and activity, typically for less than 5 minutes, resolving without intervention.  Last night the chest burning lasted longer than 5 minutes prompting him to seek evaluation in the ER.     EKG on arrival with non specific ST changes, Troponin's elevated 720<1320<5162  - he was given 324 of aspirin and a heparin drip was initiated     CONNIE: EF 45%, grade I DD, akinesis in the basal inferior, hypsokinesis in basal inferoseptal, mid inferior and mid inferolateral, Mild AS with valve area of 1.93 cm 2, trace MR    Patient currently on: aspirin 81 mg , metoprolol tartrate 25 mg twice daily , atorvastatin, heparin drip   Essential hypertension  Patient takes lisinopril outpatient,   Hyperlipidemia  Patient takes atorvastatin 20 mg daily, and tricor 145 mg - atorvastatin increased   1/28/25 lipid panel: Tchol 118, LDL 65, HDL 36  Recommendations  Continue with above medications  Await transfer to [place] for [facility]     Subjective   Patient denies chest burning at this time     Objective :  HR:  [53-85] 85  BP: (106-130)/(55-66) 117/65  Resp:  [14-20] 14  SpO2:  [92 %-98 %] 98 %  O2 Device: None (Room air)  Orthostatic Blood Pressures      Flowsheet Row Most Recent Value   Blood Pressure 117/65 filed at 01/29/2025 0900   Patient Position - Orthostatic VS Sitting filed at 01/29/2025 0900          First Weight: Weight - Scale: 95.3 kg (210 lb) (01/28/25 0636)  Vitals:    01/28/25 0636 01/29/25 0712   Weight: 95.3 kg (210 lb) 95.3 kg (210 lb)     Physical  "Exam  Constitutional:       Appearance: Normal appearance.   HENT:      Head: Normocephalic and atraumatic.      Mouth/Throat:      Mouth: Mucous membranes are moist.   Cardiovascular:      Rate and Rhythm: Normal rate and regular rhythm.      Pulses: Normal pulses.      Heart sounds: Normal heart sounds. No murmur heard.  Pulmonary:      Effort: Pulmonary effort is normal.      Breath sounds: Normal breath sounds.   Abdominal:      General: Bowel sounds are normal.      Palpations: Abdomen is soft.   Musculoskeletal:      Cervical back: Normal range of motion.      Right lower leg: No edema.      Left lower leg: No edema.   Skin:     General: Skin is warm.      Capillary Refill: Capillary refill takes less than 2 seconds.   Neurological:      General: No focal deficit present.      Mental Status: He is alert and oriented to person, place, and time.         Lab Results: I have reviewed the following results:  Results from last 7 days   Lab Units 01/29/25  0618 01/28/25  0240   WBC Thousand/uL 8.09 9.27   HEMOGLOBIN g/dL 15.0 14.7   HEMATOCRIT % 45.1 45.1   PLATELETS Thousands/uL 322 342     Results from last 7 days   Lab Units 01/29/25  0618 01/28/25  0240   POTASSIUM mmol/L 4.4 4.1   CHLORIDE mmol/L 103 103   CO2 mmol/L 28 29   BUN mg/dL 19 20   CREATININE mg/dL 1.16 1.14   CALCIUM mg/dL 9.3 9.2     Results from last 7 days   Lab Units 01/29/25  0409 01/28/25  2215 01/28/25  1559 01/28/25  1003 01/28/25  0335   INR   --   --   --   --  1.00   PTT seconds 119* 56* 51*   < > 29    < > = values in this interval not displayed.     Lab Results   Component Value Date    HGBA1C 6.2 (H) 01/28/2025     No results found for: \"CKTOTAL\", \"CKMB\", \"CKMBINDEX\", \"TROPONINI\"    Imaging Results Review: I reviewed radiology reports from this admission including: Echocardiogram.  Other Study Results Review: EKG was reviewed.     VTE Pharmacologic Prophylaxis: VTE covered by:  heparin (porcine), Intravenous, 17 Units/kg/hr at 01/29/25 " 0619  heparin (porcine), Intravenous, 1,950 Units at 01/28/25 2252  heparin (porcine), Intravenous, 3,900 Units at 01/28/25 1036     VTE Mechanical Prophylaxis: sequential compression device

## 2025-01-29 NOTE — DISCHARGE SUMMARY
"Discharge Summary - Hospitalist   Name: Brandon Thompson 69 y.o. male I MRN: [id number]  Unit/Bed#: ED 04 I Date of Admission: 1/28/2025   Date of Service: 1/29/2025 I Hospital Day: 0     Assessment & Plan  NSTEMI (non-ST elevated myocardial infarction) ([facility])  Patient presented with about 3-4 weeks of nonradiating, burning chest pain  Trops elevated in ED   Trend: 422 - [phone] - [phone]  ED discussed symptoms, troponins, and EKGs with cardiology.  It was reccommended patient should be transferred to [facility].  Accepted at [facility] but currently at capacity.   Awaiting bed availability and transfer to [place].   In ED, received aspirin 324 mg. Will continue 81 mg, daily.  Lipid panel WNL, hgba1c 6.2  Continue heparin drip   Control pain/discomfort with PRN nitroglycerin.   Continue telemetry.   Cardiology following, appreciate recs   Patient is stable for transfer to [place]   Abnormal CT scan  In ED, CT chest without contrast completed with findings of \"No acute abnormality identified. 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  Essential hypertension  Will continue home regimen of Lisinopril 20 mg, daily.  Continue to monitor vital signs, per unit protocol.   GERD (gastroesophageal reflux disease)  Will continue home regimen of omeprazole 20 mg, daily.  Type 2 diabetes mellitus without complication, without long-term current use of insulin ([facility])    Lab Results   Component Value Date    HGBA1C 6.2 (H) 01/28/2025     Originally, patient explains that he was on metformin 500 mg, daily.  However, no longer taking this medication.    Hgb A1c 6.2  Sliding scale   Carb controlled diet   Hyperlipidemia  Will continue home regimen of atorvastatin 20 mg, daily.   Lipid panel WNL     Medical Problems      Discharging Physician / Practitioner: Lynnette Sarmiento PA-C  PCP: Gi Levi MD  Admission Date:   Discharge Date: " 01/29/25    Consultations During Hospital Stay:  Cardiology     Procedures Performed:   None     Significant Findings / Test Results:   Echo complete w/ contrast if indicated 1/29/2025  Narrative:   Left Ventricle: Left ventricular cavity size is normal. Wall thickness is mildly increased. The left ventricular ejection fraction is 45%. Systolic function is mildly reduced. Diastolic function is mildly abnormal, consistent with grade I (abnormal) relaxation.   The following segments are akinetic: basal inferior.   The following segments are hypokinetic: basal inferoseptal, mid inferior and mid inferolateral.   All other segments are normal.   Aortic Valve: The aortic valve is trileaflet. The leaflets are moderately thickened. The leaflets are moderately calcified. There is mildly reduced mobility. There is mild stenosis. The aortic valve mean gradient is 7 mmHg. The dimensionless velocity index is 0.47. The aortic valve area is 1.93 cm2.   Mitral Valve: There is trace regurgitation.     XR chest portable 1/28/2025  Impression: Chronic interstitial lung disease without acute findings.      CT chest without contrast 1/28/2025  Impression: No acute abnormality identified. Findings consistent with fibrotic interstitial lung disease     Incidental Findings:   None      Test Results Pending at Discharge (will require follow up):   None      Outpatient Tests Requested:  None     Complications:  None     Reason for Admission: chest pain     Hospital Course:   Brandon Thompson is a 69 y.o. male patient PMH hypertension, DM2, hyperlipidemia who originally presented to the hospital on 1/28/2025 due to chest pain.  Workup in the ED revealing elevated troponins and nonspecific ST changes.  Patient was given aspirin 324, and started on a heparin drip.  He was evaluated by cardiology and recommended transfer to [facility] for [facility].  He had no further chest discomfort/pain since being admitted.  Patient is stable for transfer to [place]  at this time.    Please see above list of diagnoses and related plan for additional information.     Condition at Discharge: stable    Discharge Day Visit / Exam:   * Please refer to separate progress note for these details *    Discussion with Family: Updated  (wife) at bedside.    Discharge instructions/Information to patient and family:   See after visit summary for information provided to patient and family.      Provisions for Follow-Up Care:  See after visit summary for information related to follow-up care and any pertinent home health orders.      Mobility at time of Discharge:      HLM Goal achieved. Continue to encourage appropriate mobility.     Disposition:   Acute Care Hospital Transfer to [place]    Planned Readmission: None     Discharge Medications:  See after visit summary for reconciled discharge medications provided to patient and/or family.      Administrative Statements   Discharge Statement:  I have spent a total time of 46 minutes in caring for this patient on the day of the visit/encounter. >30 minutes of time was spent on: Diagnostic results, Prognosis, Risks and benefits of tx options, Instructions for management, Patient and family education, Counseling / Coordination of care, Documenting in the medical record, Reviewing / ordering tests, medicine, procedures  , and Communicating with other healthcare professionals .    **Please Note: This note may have been constructed using a voice recognition system**

## 2025-01-30 PROBLEM — E11.9 TYPE 2 DIABETES MELLITUS WITHOUT COMPLICATION, WITHOUT LONG-TERM CURRENT USE OF INSULIN (HCC): Chronic | Status: ACTIVE | Noted: 2025-01-28

## 2025-01-30 LAB
ANION GAP SERPL CALCULATED.3IONS-SCNC: 6 MMOL/L (ref 4–13)
APTT PPP: 66 SECONDS (ref 23–34)
ATRIAL RATE: 71 BPM
BUN SERPL-MCNC: 21 MG/DL (ref 5–25)
CALCIUM SERPL-MCNC: 9.8 MG/DL (ref 8.4–10.2)
CHLORIDE SERPL-SCNC: 103 MMOL/L (ref 96–108)
CO2 SERPL-SCNC: 28 MMOL/L (ref 21–32)
CREAT SERPL-MCNC: 1.26 MG/DL (ref 0.6–1.3)
GFR SERPL CREATININE-BSD FRML MDRD: 57 ML/MIN/1.73SQ M
GLUCOSE SERPL-MCNC: 102 MG/DL (ref 65–140)
GLUCOSE SERPL-MCNC: 105 MG/DL (ref 65–140)
GLUCOSE SERPL-MCNC: 112 MG/DL (ref 65–140)
GLUCOSE SERPL-MCNC: 99 MG/DL (ref 65–140)
KCT BLD-ACNC: 299 SEC (ref 89–137)
P AXIS: 26 DEGREES
POTASSIUM SERPL-SCNC: 4.2 MMOL/L (ref 3.5–5.3)
PR INTERVAL: 166 MS
QRS AXIS: -7 DEGREES
QRSD INTERVAL: 90 MS
QT INTERVAL: 390 MS
QTC INTERVAL: 423 MS
SODIUM SERPL-SCNC: 137 MMOL/L (ref 135–147)
SPECIMEN SOURCE: ABNORMAL
T WAVE AXIS: -28 DEGREES
VENTRICULAR RATE: 71 BPM

## 2025-01-30 PROCEDURE — 99153 MOD SED SAME PHYS/QHP EA: CPT | Performed by: INTERNAL MEDICINE

## 2025-01-30 PROCEDURE — 99152 MOD SED SAME PHYS/QHP 5/>YRS: CPT | Performed by: INTERNAL MEDICINE

## 2025-01-30 PROCEDURE — C1894 INTRO/SHEATH, NON-LASER: HCPCS | Performed by: INTERNAL MEDICINE

## 2025-01-30 PROCEDURE — 82948 REAGENT STRIP/BLOOD GLUCOSE: CPT

## 2025-01-30 PROCEDURE — 93005 ELECTROCARDIOGRAM TRACING: CPT

## 2025-01-30 PROCEDURE — 027034Z DILATION OF CORONARY ARTERY, ONE ARTERY WITH DRUG-ELUTING INTRALUMINAL DEVICE, PERCUTANEOUS APPROACH: ICD-10-PCS | Performed by: INTERNAL MEDICINE

## 2025-01-30 PROCEDURE — 99232 SBSQ HOSP IP/OBS MODERATE 35: CPT | Performed by: INTERNAL MEDICINE

## 2025-01-30 PROCEDURE — 85730 THROMBOPLASTIN TIME PARTIAL: CPT | Performed by: INTERNAL MEDICINE

## 2025-01-30 PROCEDURE — B2111ZZ FLUOROSCOPY OF MULTIPLE CORONARY ARTERIES USING LOW OSMOLAR CONTRAST: ICD-10-PCS | Performed by: INTERNAL MEDICINE

## 2025-01-30 PROCEDURE — 93454 CORONARY ARTERY ANGIO S&I: CPT | Performed by: INTERNAL MEDICINE

## 2025-01-30 PROCEDURE — C1725 CATH, TRANSLUMIN NON-LASER: HCPCS | Performed by: INTERNAL MEDICINE

## 2025-01-30 PROCEDURE — 93010 ELECTROCARDIOGRAM REPORT: CPT | Performed by: INTERNAL MEDICINE

## 2025-01-30 PROCEDURE — 92928 PRQ TCAT PLMT NTRAC ST 1 LES: CPT | Performed by: INTERNAL MEDICINE

## 2025-01-30 PROCEDURE — C1769 GUIDE WIRE: HCPCS | Performed by: INTERNAL MEDICINE

## 2025-01-30 PROCEDURE — C1874 STENT, COATED/COV W/DEL SYS: HCPCS | Performed by: INTERNAL MEDICINE

## 2025-01-30 PROCEDURE — 85347 COAGULATION TIME ACTIVATED: CPT

## 2025-01-30 PROCEDURE — 80048 BASIC METABOLIC PNL TOTAL CA: CPT | Performed by: NURSE PRACTITIONER

## 2025-01-30 PROCEDURE — C9600 PERC DRUG-EL COR STENT SING: HCPCS | Performed by: INTERNAL MEDICINE

## 2025-01-30 PROCEDURE — C1887 CATHETER, GUIDING: HCPCS | Performed by: INTERNAL MEDICINE

## 2025-01-30 DEVICE — STENT ONYXNG40026UX ONYX 4.00X26RX
Type: IMPLANTABLE DEVICE | Site: CORONARY | Status: FUNCTIONAL
Brand: ONYX FRONTIER™

## 2025-01-30 RX ORDER — HEPARIN SODIUM 1000 [USP'U]/ML
INJECTION, SOLUTION INTRAVENOUS; SUBCUTANEOUS CODE/TRAUMA/SEDATION MEDICATION
Status: DISCONTINUED | OUTPATIENT
Start: 2025-01-30 | End: 2025-01-30 | Stop reason: HOSPADM

## 2025-01-30 RX ORDER — VERAPAMIL HYDROCHLORIDE 2.5 MG/ML
INJECTION, SOLUTION INTRAVENOUS CODE/TRAUMA/SEDATION MEDICATION
Status: DISCONTINUED | OUTPATIENT
Start: 2025-01-30 | End: 2025-01-30 | Stop reason: HOSPADM

## 2025-01-30 RX ORDER — LISINOPRIL 20 MG/1
20 TABLET ORAL DAILY
Status: DISCONTINUED | OUTPATIENT
Start: 2025-01-31 | End: 2025-01-31

## 2025-01-30 RX ORDER — LIDOCAINE HYDROCHLORIDE 10 MG/ML
INJECTION, SOLUTION EPIDURAL; INFILTRATION; INTRACAUDAL; PERINEURAL CODE/TRAUMA/SEDATION MEDICATION
Status: DISCONTINUED | OUTPATIENT
Start: 2025-01-30 | End: 2025-01-30 | Stop reason: HOSPADM

## 2025-01-30 RX ORDER — SODIUM CHLORIDE 9 MG/ML
125 INJECTION, SOLUTION INTRAVENOUS CONTINUOUS
Status: DISCONTINUED | OUTPATIENT
Start: 2025-01-30 | End: 2025-01-30

## 2025-01-30 RX ORDER — ASPIRIN 81 MG/1
243 TABLET, CHEWABLE ORAL ONCE
Status: COMPLETED | OUTPATIENT
Start: 2025-01-30 | End: 2025-01-30

## 2025-01-30 RX ORDER — NITROGLYCERIN 20 MG/100ML
INJECTION INTRAVENOUS CODE/TRAUMA/SEDATION MEDICATION
Status: DISCONTINUED | OUTPATIENT
Start: 2025-01-30 | End: 2025-01-30 | Stop reason: HOSPADM

## 2025-01-30 RX ORDER — MIDAZOLAM HYDROCHLORIDE 2 MG/2ML
INJECTION, SOLUTION INTRAMUSCULAR; INTRAVENOUS CODE/TRAUMA/SEDATION MEDICATION
Status: DISCONTINUED | OUTPATIENT
Start: 2025-01-30 | End: 2025-01-30 | Stop reason: HOSPADM

## 2025-01-30 RX ORDER — FENTANYL CITRATE 50 UG/ML
INJECTION, SOLUTION INTRAMUSCULAR; INTRAVENOUS CODE/TRAUMA/SEDATION MEDICATION
Status: DISCONTINUED | OUTPATIENT
Start: 2025-01-30 | End: 2025-01-30 | Stop reason: HOSPADM

## 2025-01-30 RX ADMIN — FOLIC ACID 1 MG: 1 TABLET ORAL at 09:07

## 2025-01-30 RX ADMIN — METOPROLOL TARTRATE 25 MG: 25 TABLET, FILM COATED ORAL at 09:07

## 2025-01-30 RX ADMIN — SODIUM CHLORIDE 125 ML/HR: 0.9 INJECTION, SOLUTION INTRAVENOUS at 10:00

## 2025-01-30 RX ADMIN — TICAGRELOR 90 MG: 90 TABLET ORAL at 21:10

## 2025-01-30 RX ADMIN — HEPARIN SODIUM 15.1 UNITS/KG/HR: 10000 INJECTION, SOLUTION INTRAVENOUS at 03:29

## 2025-01-30 RX ADMIN — ASPIRIN 81 MG CHEWABLE TABLET 81 MG: 81 TABLET CHEWABLE at 09:07

## 2025-01-30 RX ADMIN — ASPIRIN 81 MG CHEWABLE TABLET 243 MG: 81 TABLET CHEWABLE at 10:04

## 2025-01-30 RX ADMIN — METOPROLOL TARTRATE 25 MG: 25 TABLET, FILM COATED ORAL at 21:10

## 2025-01-30 RX ADMIN — ATORVASTATIN CALCIUM 80 MG: 80 TABLET, FILM COATED ORAL at 15:58

## 2025-01-30 RX ADMIN — PANTOPRAZOLE SODIUM 20 MG: 20 TABLET, DELAYED RELEASE ORAL at 06:07

## 2025-01-30 RX ADMIN — TICAGRELOR 180 MG: 90 TABLET ORAL at 10:04

## 2025-01-30 NOTE — CASE MANAGEMENT
Case Management Assessment    Patient name Brandon Thompson  Location [facility]-01 MRN [id number]  : 1955 Date 2025       Current Admission Date: 2025  Current Admission Diagnosis:NSTEMI (non-ST elevated myocardial infarction) (HCC)   Patient Active Problem List    Diagnosis Date Noted Date Diagnosed    NSTEMI (non-ST elevated myocardial infarction) (HCC) 2025     Essential hypertension 2025     Hyperlipidemia 2025     GERD (gastroesophageal reflux disease) 2025     Pre diabetes 2025     Abnormal CT scan 2025       LOS (days): 1  Geometric Mean LOS (GMLOS) (days): 1.7  Days to GMLOS:1     OBJECTIVE:    Risk of Unplanned Readmission Score: 8.71         Current admission status: Inpatient       Preferred Pharmacy:   [facility] [address] [address]  Phone: [phone] Fax: [phone]    Primary Care Provider: Gi Levi MD    Primary Insurance: [facility]  Secondary Insurance:     ASSESSMENT:  Active Health Care Proxies    There are no active Health Care Proxies on file.       Advance Directives  Does patient have a Health Care POA?: Yes  Does patient have Advance Directives?: Yes  Advance Directives: Living will, Power of  for health care  Primary Contact: wife Rosita Thompson         Readmission Root Cause  30 Day Readmission: No    Patient Information  Admitted from:: Home  Mental Status: Alert  During Assessment patient was accompanied by: Spouse  Assessment information provided by:: Patient, Spouse  Primary Caregiver: Self  Support Systems: Spouse/significant other  County of Residence: [place]  What city do you live in?: [facility]  Home entry access options. Select all that apply.: Stairs  Number of steps to enter home.: 2  Do the steps have railings?: No  Type of Current Residence: 2 story home  Upon entering residence, is there a bedroom  on the main floor (no further steps)?: No  A bedroom is located on the following floor levels of residence (select all that apply):: 2nd Floor  Upon entering residence, is there a bathroom on the main floor (no further steps)?: Yes  Number of steps to 2nd floor from main floor: One Flight  Living Arrangements: Lives w/ Spouse/significant other  Is patient a ?: No    Activities of Daily Living Prior to Admission  Functional Status: Independent  Completes ADLs independently?: Yes  Ambulates independently?: Yes  Does patient use assisted devices?: No  Does patient currently own DME?: No  Does patient have a history of Outpatient Therapy (PT/OT)?: No  Does the patient have a history of Short-Term Rehab?: No  Does patient have a history of HHC?: No  Does patient currently have HHC?: No         Patient Information Continued  Income Source: Pension/USP  Does patient have prescription coverage?: Yes  Does patient receive dialysis treatments?: No  Does patient have a history of substance abuse?: No  Does patient have a history of Mental Health Diagnosis?: No         Means of Transportation  [facility]

## 2025-01-30 NOTE — CASE MANAGEMENT
Case Management Discharge Planning Note    Patient name Brandon Thompson  Location [facility]-01 MRN [id number]  : 1955 Date 2025       Current Admission Date: 2025  Current Admission Diagnosis:NSTEMI (non-ST elevated myocardial infarction) (HCC)   Patient Active Problem List    Diagnosis Date Noted Date Diagnosed    NSTEMI (non-ST elevated myocardial infarction) (HCC) 2025     Essential hypertension 2025     Hyperlipidemia 2025     GERD (gastroesophageal reflux disease) 2025     Pre diabetes 2025     Abnormal CT scan 2025       LOS (days): 1  Geometric Mean LOS (GMLOS) (days): 1.7  Days to GMLOS:1     OBJECTIVE:  Risk of Unplanned Readmission Score: 8.71         Current admission status: Inpatient   Preferred Pharmacy:   [facility] [address] [address]  Phone: [phone] Fax: [phone]    Primary Care Provider: Gi Levi MD    Primary Insurance: [facility]  Secondary Insurance:     DISCHARGE DETAILS:    Discharge planning discussed with:: pt and wife--no HHC needs at this time  Freedom of Choice: Yes     CM contacted family/caregiver?: Yes  Were Treatment Team discharge recommendations reviewed with patient/caregiver?: Yes  Did patient/caregiver verbalize understanding of patient care needs?: Yes  Were patient/caregiver advised of the risks associated with not following Treatment Team discharge recommendations?: Yes    Contacts  Patient Contacts: wife Rosita Thompson  Relationship to Patient:: Family  Contact Method: Phone  Phone Number: [phone]  Reason/Outcome: Continuity of Care, Emergency Contact, Discharge Planning    Requested Home Health Care         Is the patient interested in HHC at discharge?: No    DME Referral Provided  Referral made for DME?: No    Other Referral/Resources/Interventions Provided:  Referral Comments: No HHC needs at  this time.    Would you like to participate in our [facility] service program?  : Yes    Treatment Team Recommendation: Home  Discharge Destination Plan:: Home  Transport at Discharge : Family                                      Additional Comments: 68yo male admitted with NSTEMI, had exertional neck pain. 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 . DM2 without insulin, ILD, HTN. Independent ADLs. Would like RXs from [facility] at discharge. Then he will use mail order. Lives with wife in 2 story home. No HHC needs. Plan home 1/31/25.

## 2025-01-30 NOTE — PLAN OF CARE
Problem: PAIN - ADULT  Goal: Verbalizes/displays adequate comfort level or baseline comfort level  Description: Interventions:  - Encourage patient to monitor pain and request assistance  - Assess pain using appropriate pain scale  - Administer analgesics based on type and severity of pain and evaluate response  - Implement non-pharmacological measures as appropriate and evaluate response  - Consider cultural and social influences on pain and pain management  - Notify physician/advanced practitioner if interventions unsuccessful or patient reports new pain  Outcome: Progressing     Problem: INFECTION - ADULT  Goal: Absence or prevention of progression during hospitalization  Description: INTERVENTIONS:  - Assess and monitor for signs and symptoms of infection  - Monitor lab/diagnostic results  - Monitor all insertion sites, i.e. indwelling lines, tubes, and drains  - Monitor endotracheal if appropriate and nasal secretions for changes in amount and color  - [place] appropriate cooling/warming therapies per order  - Administer medications as ordered  - Instruct and encourage patient and family to use good hand hygiene technique  - Identify and instruct in appropriate isolation precautions for identified infection/condition  Outcome: Progressing  Goal: Absence of fever/infection during neutropenic period  Description: INTERVENTIONS:  - Monitor WBC    Outcome: Progressing     Problem: SAFETY ADULT  Goal: Patient will remain free of falls  Description: INTERVENTIONS:  - Educate patient/family on patient safety including physical limitations  - Instruct patient to call for assistance with activity   - Consult OT/PT to assist with strengthening/mobility   - Keep Call bell within reach  - Keep bed low and locked with side rails adjusted as appropriate  - Keep care items and personal belongings within reach  - Initiate and maintain comfort rounds  - Make Fall Risk Sign visible to staff  - Offer Toileting every 2 Hours,  in advance of need  - Initiate/Maintain bedalarm  - Obtain necessary fall risk management equipment: 2  - Apply yellow socks and bracelet for high fall risk patients  - Consider moving patient to room near nurses station  Outcome: Progressing  Goal: Maintain or return to baseline ADL function  Description: INTERVENTIONS:  -  Assess patient's ability to carry out ADLs; assess patient's baseline for ADL function and identify physical deficits which impact ability to perform ADLs (bathing, care of mouth/teeth, toileting, grooming, dressing, etc.)  - Assess/evaluate cause of self-care deficits   - Assess range of motion  - Assess patient's mobility; develop plan if impaired  - Assess patient's need for assistive devices and provide as appropriate  - Encourage maximum independence but intervene and supervise when necessary  - Involve family in performance of ADLs  - Assess for home care needs following discharge   - Consider OT consult to assist with ADL evaluation and planning for discharge  - Provide patient education as appropriate  Outcome: Progressing  Goal: Maintains/Returns to pre admission functional level  Description: INTERVENTIONS:  - Perform AM-PAC 6 Click Basic Mobility/ Daily Activity assessment daily.  - Set and communicate daily mobility goal to care team and patient/family/caregiver.   - Collaborate with rehabilitation services on mobility goals if consulted  - Perform Range of Motion 2 times a day.  - Reposition patient every 2 hours.  - Dangle patient 2 times a day  - Stand patient 2 times a day  - Ambulate patient 2 times a day  - Out of bed to chair 2 times a day   - Out of bed for meals 2 times a day  - Out of bed for toileting  - Record patient progress and toleration of activity level   Outcome: Progressing     Problem: Knowledge Deficit  Goal: Patient/family/caregiver demonstrates understanding of disease process, treatment plan, medications, and discharge instructions  Description: Complete  learning assessment and assess knowledge base.  Interventions:  - Provide teaching at level of understanding  - Provide teaching via preferred learning methods  Outcome: Progressing

## 2025-01-30 NOTE — UTILIZATION REVIEW
Initial Clinical Review    The patient was transferred to [facility] on 01/29 from [facility], where care began on 01/28. 1 midnight has already been surpassed with active ongoing care.     Admission: Date/Time/Statement:   Admission Orders (From admission, onward)       Ordered        01/29/25 2152  INPATIENT ADMISSION  Once                          Orders Placed This Encounter   Procedures    INPATIENT ADMISSION     Standing Status:   Standing     Number of Occurrences:   1     Level of Care:   Med Surg [16]     Estimated length of stay:   More than 2 Midnights     Certification:   I certify that inpatient services are medically necessary for this patient for a duration of greater than two midnights. See H&P and MD Progress Notes for additional information about the patient's course of treatment.     ED Arrival Information       Patient not seen in ED                       No chief complaint on file.      Initial Presentation: 69 y.o. male w/ PMH of T2DM, HLD, GERD, HTN was transferred from [facility] to [facility] for a higher level of care.  Pt initially presented to [facility] w/ 3-4 weeks of nonradiating burning chest pain. EKG with subtle abnormalities, echo with mildly reduced ejection fraction at 45% grade 1 diastolic dysfunction. Troponin leak up to 5100. Transferred  to [facility] for cardiac catheterization for NSTEMI.   On arrival to [facility], pt alert, normal HR.     Admit as Inpatient for evaluation and treatment of NSTEMI.  Plan: Consult cardiology. Continue heparin drip. Continue aspirin, Lipitor, Lopressor 25 mg twice daily. N.p.o. at midnight.    Anticipated Length of Stay/Certification Statement: Patient will be admitted on an inpatient basis with an anticipated length of stay of greater than 2 midnights secondary to cath.     Date: 01/30  Day 3: Has surpassed a 2nd midnight with active treatments and services.  Pt reports feeling okay this am. Denies any overt chest pain or  shortness of breath. Stable on telemetry. On IV heparin, ASA, statin, and beta blocker. Evaluated by cardiology. N.p.o. for cardiac catheterization today to define coronary anatomy. Load with 180 mg ticagrelor. Continue aspirin, statin, and beta-blocker. Hold lisinopril pre cardiac catheterization, IV fluids pre and post cath. Up titration of GDMT post procedure. Continue to monitor on telemetry.    ED Treatment-Medication Administration - No Administrations Displayed (No Start Event Found)       None            Scheduled Medications:  aspirin, 81 mg, Oral, Daily  atorvastatin, 80 mg, Oral, Daily With Dinner  folic acid, 1 mg, Oral, Daily  insulin lispro, 1-6 Units, Subcutaneous, TID AC  insulin lispro, 1-6 Units, Subcutaneous, HS  [START ON 1/31/2025] lisinopril, 20 mg, Oral, Daily  metoprolol tartrate, 25 mg, Oral, Q12H HANNAH  pantoprazole, 20 mg, Oral, Early Morning  ticagrelor, 90 mg, Oral, Q12H HANNAH      Continuous IV Infusions:     PRN Meds:  acetaminophen, 650 mg, Oral, Q6H PRN  nitroglycerin, 0.4 mg, Sublingual, Q5 Min PRN      ED Triage Vitals   Temperature Pulse Respirations Blood Pressure SpO2 Pain Score   01/29/25 2142 01/29/25 2142 01/29/25 2142 01/29/25 2142 01/30/25 0317 01/29/25 2143   98.5 °F (36.9 °C) 68 20 115/75 96 % No Pain     Weight (last 2 days)       Date/Time Weight    01/29/25 2143 102 (224.21)            Vital Signs (last 3 days)       Date/Time Temp Pulse Resp BP MAP (mmHg) SpO2 Calculated FIO2 (%) - Nasal Cannula Nasal Cannula O2 Flow Rate (L/min) O2 Device Patient Position - Orthostatic VS Armando Coma Scale Score Pain    01/30/25 1405 -- 82 16 112/64 80 98 % -- -- None (Room air) -- -- --    01/30/25 1335 -- 59 18 102/54 84 97 % -- -- None (Room air) Sitting -- --    01/30/25 1305 -- 56 16 114/66 82 97 % -- -- None (Room air) -- -- --    01/30/25 1250 -- 55 16 110/52 83 97 % -- -- None (Room air) Sitting -- --    01/30/25 1235 -- 64 16 98/58 -- 98 % -- -- None (Room air) Sitting -- --     01/30/25 12:19:25 -- 68 18 100/56 71 97 % -- -- None (Room air) -- -- --    01/30/25 12:17:12 98.1 °F (36.7 °C) -- -- -- -- -- -- -- -- -- -- --    01/30/25 12:15:08 98.1 °F (36.7 °C) -- -- -- -- -- -- -- -- -- -- --    01/30/25 11:54:53 -- -- -- -- -- -- -- -- -- -- -- No Pain    01/30/25 11:52:21 -- -- -- -- -- 98 % -- -- None (Room air) -- -- --    01/30/25 11:15:46 -- -- -- -- -- -- 28 2 L/min Nasal cannula -- -- --    01/30/25 11:10:16 -- -- -- -- -- -- -- -- -- -- -- No Pain    01/30/25 1043 -- -- -- -- -- -- -- -- -- -- -- No Pain    01/30/25 0733 -- -- -- -- -- -- -- -- -- -- 15 No Pain    01/30/25 07:06:23 97.8 °F (36.6 °C) 66 16 122/68 86 97 % -- -- None (Room air) -- -- --    01/30/25 07:05:03 97.8 °F (36.6 °C) 67 -- -- -- 97 % -- -- -- -- -- --    01/30/25 0317 98.1 °F (36.7 °C) 71 18 125/69 80 96 % -- -- None (Room air) -- -- --    01/29/25 2224 99.1 °F (37.3 °C) 85 20 104/68 -- -- -- -- -- -- -- No Pain    01/29/25 2143 -- -- -- -- -- -- -- -- -- -- -- No Pain    01/29/25 2142 98.5 °F (36.9 °C) 68 20 115/75 -- -- -- -- -- Lying -- --              Pertinent Labs/Diagnostic Test Results:   Radiology:  No orders to display     Cardiology:  Cardiac catheterization   Final Result by Foster Araya DO (01/30 1155)        mid RCA PCI culprit patients presentation NSTEMI, successful    percutaneous revascularization with MIKE placement     Mid RCA lesion is 90% stenosed.           GI:  No orders to display           Results from last 7 days   Lab Units 01/29/25  0618 01/28/25  0240   WBC Thousand/uL 8.09 9.27   HEMOGLOBIN g/dL 15.0 14.7   HEMATOCRIT % 45.1 45.1   PLATELETS Thousands/uL 322 342   TOTAL NEUT ABS Thousands/µL  --  5.43         Results from last 7 days   Lab Units 01/30/25  0914 01/29/25  0618 01/28/25  0240   SODIUM mmol/L 137 137 138   POTASSIUM mmol/L 4.2 4.4 4.1   CHLORIDE mmol/L 103 103 103   CO2 mmol/L 28 28 29   ANION GAP mmol/L 6 6 6   BUN mg/dL 21 19 20   CREATININE mg/dL 1.26 1.16  "1.14   EGFR ml/min/1.73sq m 57 63 64   CALCIUM mg/dL 9.8 9.3 9.2     Results from last 7 days   Lab Units 01/28/25  0240   AST U/L 27   ALT U/L 20   ALK PHOS U/L 57   TOTAL PROTEIN g/dL 7.0   ALBUMIN g/dL 4.1   TOTAL BILIRUBIN mg/dL 0.54     Results from last 7 days   Lab Units 01/30/25  0550 01/29/25  2210 01/29/25  1621 01/29/25  1115 01/29/25  0813 01/28/25  2212 01/28/25  1554 01/28/25  0904   POC GLUCOSE mg/dl 112 119 102 104 88 109 101 117     Results from last 7 days   Lab Units 01/30/25  0914 01/29/25  0618 01/28/25  0240   GLUCOSE RANDOM mg/dL 102 105 120         Results from last 7 days   Lab Units 01/28/25  0722 01/28/25  0240   HEMOGLOBIN A1C % 6.2* 6.4*   EAG mg/dl 131 137     No results found for: \"BETA-HYDROXYBUTYRATE\"                   Results from last 7 days   Lab Units 01/28/25  0647 01/28/25  0436 01/28/25  0240   HS TNI 0HR ng/L  --   --  781*   HS TNI 2HR ng/L  --  1,320*  --    HSTNI D2 ng/L  --  539*  --    HS TNI 4HR ng/L 5,162*  --   --    HSTNI D4 ng/L 4,381*  --   --          Results from last 7 days   Lab Units 01/30/25  0441 01/29/25  2218 01/29/25  1234 01/28/25  1003 01/28/25  0335   PROTIME seconds  --   --   --   --  13.7   INR   --   --   --   --  1.00   PTT seconds 66* 33 >210*   < > 29    < > = values in this interval not displayed.             Results from last 7 days   Lab Units 01/28/25  0240   LIPASE u/L 14         No past medical history on file.  Present on Admission:   NSTEMI (non-ST elevated myocardial infarction) (HCC)   Pre diabetes   Hyperlipidemia   Essential hypertension   Abnormal CT scan      Admitting Diagnosis: NSTEMI (non-ST elevated myocardial infarction) (HCC) [I21.4]  Age/Sex: 69 y.o. male    Network Utilization Review Department  ATTENTION: Please call with any questions or concerns to [phone] and carefully listen to the prompts so that you are directed to the right person. All voicemails are confidential.   For Discharge needs, contact Care Management " DC Support Team at [phone] opt. 2  Send all requests for admission clinical reviews, approved or denied determinations and any other requests to dedicated fax number below belonging to the campus where the patient is receiving treatment. List of dedicated fax numbers for the Facilities:  FACILITY NAME UR FAX NUMBER   ADMISSION DENIALS (Administrative/Medical Necessity) [phone]   DISCHARGE SUPPORT TEAM (NETWORK) [phone]   PARENT CHILD HEALTH (Maternity/NICU/Pediatrics) [phone]   [facility] [phone]   [facility] [phone]   Critical access hospital [phone]   [facility] [phone]   [facility] [phone]   [facility] [phone]   [facility] [phone]   [facility] [phone]   [facility] [phone]   [facility] [phone]   [facility] [phone]   [facility] [phone]

## 2025-01-30 NOTE — H&P
H&P - Hospitalist   Name: Brandon Thompson 69 y.o. male I MRN: [id number]  Unit/Bed#: PPHP-326-01 I Date of Admission: 1/29/2025   Date of Service: 1/29/2025 I Hospital Day: 0     Assessment & Plan  NSTEMI (non-ST elevated myocardial infarction) (HCC)  Presented to [facility] due to 3 to 4 weeks of nonradiating burning chest pain  Seen by cardiology, EKG with subtle abnormalities, echo with mildly reduced ejection fraction at 45% grade 1 diastolic dysfunction  Troponin leak up to 5100  Transferred to [facility] for cath    Plan:  Consult cardiology  Continue heparin drip  Continue aspirin, Lipitor, Lopressor 25 mg twice daily  N.p.o. at midnight  Essential hypertension  Home regimen is lisinopril 20mg , metoprolol was started in the hospital  We will continue both medications  Hyperlipidemia  On fenofibrate, omega-3 and statin, continue  Type 2 diabetes mellitus without complication, without long-term current use of insulin ([facility])  Lab Results   Component Value Date    HGBA1C 6.2 (H) 01/28/2025       Recent Labs     01/28/25  2212 01/29/25  0813 01/29/25  1115 01/29/25  1621   POCGLU 109 88 104 102       Blood Sugar Average: Last 72 hrs:    Not on any medications outpatient    Continue sliding scale insulin    Abnormal CT scan  CT scan at [facility] showed findings consistent with interstitial lung disease    Monitor O2 saturations  Pulm eval outpatient      VTE Pharmacologic Prophylaxis:   Moderate Risk (Score 3-4) - Pharmacological DVT Prophylaxis Ordered: heparin drip.  Code Status: Level 3 - DNAR and DNI   Discussion with family: Patient declined call to .     Anticipated Length of Stay: Patient will be admitted on an inpatient basis with an anticipated length of stay of greater than 2 midnights secondary to cath.    History of Present Illness   Chief Complaint: Chest pain    Brandon Thompson is a 69 y.o. male with a past medical history of type 2 diabetes, hyperlipidemia, GERD, hypertension presents to  [place] due to chest pain.  Troponin significantly elevated, discussion with cardiology to transfer patient to [facility] for catheterization for NSTEMI.    Review of Systems   Constitutional:  Negative for chills and fatigue.   Respiratory:  Negative for choking, chest tightness and shortness of breath.    Cardiovascular:  Negative for chest pain, palpitations and leg swelling.   Gastrointestinal:  Negative for abdominal distention, abdominal pain, diarrhea, nausea and vomiting.   Genitourinary:  Positive for frequency. Negative for difficulty urinating, dysuria and enuresis.   Neurological:  Negative for dizziness, syncope, light-headedness and headaches.       Historical Information   No past medical history on file.  No past surgical history on file.  Social History     Tobacco Use    Smoking status: Never    Smokeless tobacco: Never   Substance and Sexual Activity    Alcohol use: Yes     Alcohol/week: 1.0 standard drink of alcohol     Types: 1 Standard drinks or equivalent per week    Drug use: Never    Sexual activity: Not Currently     E-Cigarette/Vaping     E-Cigarette/Vaping Substances     Family history non-contributory  Social History:  Marital Status: /Civil Union   Patient Pre-hospital Living Situation: Home  Patient Pre-hospital Level of Mobility: walks  Patient Pre-hospital Diet Restrictions: none    Meds/Allergies   I have reviewed home medications using recent Epic encounter.  Prior to Admission medications    Medication Sig Start Date End Date Taking? Authorizing Provider   aspirin 81 mg chewable tablet Chew 81 mg daily    Historical Provider, MD   Aspirin Buf,CaCarb-MgCarb-MgO, 81 MG TABS Take 1 tablet by mouth daily    Historical Provider, MD   atorvastatin (LIPITOR) 20 mg tablet Take 20 mg by mouth 1/15/23   Historical Provider, MD   clindamycin (CLEOCIN) 300 MG capsule Take 2 capsules by mouth 12/1/15   Historical Provider, MD   fenofibrate (TRICOR) 145 mg tablet Take 1 tablet by mouth  daily    Historical Provider, MD   Folic Acid 0.8 MG CAPS every 24 hours    Historical Provider, MD   lisinopril (ZESTRIL) 20 mg tablet Take 20 mg by mouth 1/15/23   Historical Provider, MD   multivitamin (THERAGRAN) TABS Take 1 tablet by mouth daily    Historical Provider, MD   Terry-3 1000 MG CAPS Take 2 g by mouth daily    Historical Provider, MD   omeprazole (PriLOSEC OTC) 20 MG tablet 20 mg every 24 hours    Historical Provider, MD   Potassium 99 MG TABS every 24 hours    Historical Provider, MD     Allergies   Allergen Reactions    Penicillins Edema and Swelling    Sulfa Antibiotics Edema and Swelling       Objective :  Temp:  [98.5 °F (36.9 °C)] 98.5 °F (36.9 °C)  HR:  [67-85] 68  BP: (106-130)/(55-75) 115/75  Resp:  [14-21] 20  SpO2:  [92 %-98 %] 96 %  O2 Device: None (Room air)    Physical Exam  Vitals and nursing note reviewed.   Constitutional:       General: He is not in acute distress.     Appearance: He is well-developed.   HENT:      Head: Normocephalic and atraumatic.   Eyes:      Extraocular Movements: Extraocular movements intact.   Cardiovascular:      Rate and Rhythm: Normal rate and regular rhythm.      Heart sounds: No murmur heard.  Pulmonary:      Effort: Pulmonary effort is normal. No respiratory distress.      Breath sounds: Normal breath sounds. No wheezing or rales.   Abdominal:      General: Abdomen is flat. Bowel sounds are normal. There is no distension.      Palpations: Abdomen is soft.      Tenderness: There is no abdominal tenderness.   Musculoskeletal:         General: No swelling.      Cervical back: Neck supple.      Right lower leg: No edema.      Left lower leg: No edema.   Skin:     General: Skin is warm and dry.      Capillary Refill: Capillary refill takes less than 2 seconds.   Neurological:      General: No focal deficit present.      Mental Status: He is alert.   Psychiatric:         Mood and Affect: Mood normal.          Lines/Drains:            Lab Results: I have  reviewed the following results:  Results from last 7 days   Lab Units 01/29/25  0618 01/28/25  0240   WBC Thousand/uL 8.09 9.27   HEMOGLOBIN g/dL 15.0 14.7   HEMATOCRIT % 45.1 45.1   PLATELETS Thousands/uL 322 342   SEGS PCT %  --  58   LYMPHO PCT %  --  26   MONO PCT %  --  12   EOS PCT %  --  3     Results from last 7 days   Lab Units 01/29/25  0618 01/28/25  0240   SODIUM mmol/L 137 138   POTASSIUM mmol/L 4.4 4.1   CHLORIDE mmol/L 103 103   CO2 mmol/L 28 29   BUN mg/dL 19 20   CREATININE mg/dL 1.16 1.14   ANION GAP mmol/L 6 6   CALCIUM mg/dL 9.3 9.2   ALBUMIN g/dL  --  4.1   TOTAL BILIRUBIN mg/dL  --  0.54   ALK PHOS U/L  --  57   ALT U/L  --  20   AST U/L  --  27   GLUCOSE RANDOM mg/dL 105 120     Results from last 7 days   Lab Units 01/28/25  0335   INR  1.00     Results from last 7 days   Lab Units 01/29/25  1621 01/29/25  1115 01/29/25  0813 01/28/25  2212 01/28/25  1554 01/28/25  0904   POC GLUCOSE mg/dl 102 104 88 109 101 117     Lab Results   Component Value Date    HGBA1C 6.2 (H) 01/28/2025    HGBA1C 6.4 (H) 01/28/2025    HGBA1C 6.3 (H) 11/25/2015           Imaging Results Review: I reviewed radiology reports from this admission including: CT chest and Echocardiogram.  Other Study Results Review: EKG was reviewed.     Administrative Statements       ** Please Note: This note has been constructed using a voice recognition system. **

## 2025-01-30 NOTE — ASSESSMENT & PLAN NOTE
Presented to [facility] due to 3 to 4 weeks of nonradiating burning chest pain  Seen by cardiology, EKG with subtle abnormalities, echo with mildly reduced ejection fraction at 45% grade 1 diastolic dysfunction  Troponin leak up to 5100  Transferred to [facility] for cath    Continue heparin drip  Continue aspirin, Lipitor, Lopressor 25 mg twice daily  N.p.o. for cardiac cath today

## 2025-01-30 NOTE — ASSESSMENT & PLAN NOTE
Prior to admission taking lisinopril 20 mg daily - held pre [facility]  Started on metoprolol tartrate 25 mg BID this admission  BP controlled

## 2025-01-30 NOTE — ASSESSMENT & PLAN NOTE
, LDL 65, HDL 36 on atorvastatin 20 mg daily and Tricor as an outpatient. Atorvastatin increased to 80 mg daily.

## 2025-01-30 NOTE — ASSESSMENT & PLAN NOTE
Home regimen is lisinopril 20mg , metoprolol was started in the hospital  Continue both medications  Continue to monitor

## 2025-01-30 NOTE — PROGRESS NOTES
Progress Note - Cardiology   Name: Brandon Thompson 69 y.o. male I MRN: [id number]  Unit/Bed#: PPHP-326-01 I Date of Admission: 1/29/2025   Date of Service: 1/30/2025 I Hospital Day: 1     Assessment & Plan  NSTEMI (non-ST elevated myocardial infarction) (HCC)  Presented to [facility] with intermittent burning CP. Occurring on/off x 3 weeks, progressively getting worse/longer  ECG- nonspecific ST changes  Troponin up to 5162  Echocardiogram: LVEF 45% with inferior wall hypokinesis. Mild AS and trace MR  On IV heparin, ASA, statin, and beta blocker  Essential hypertension  Prior to admission taking lisinopril 20 mg daily - held pre [facility]  Started on metoprolol tartrate 25 mg BID this admission  BP controlled  Hyperlipidemia  , LDL 65, HDL 36 on atorvastatin 20 mg daily and Tricor as an outpatient. Atorvastatin increased to 80 mg daily.  Pre diabetes  Lab Results   Component Value Date    HGBA1C 6.2 (H) 01/28/2025   On SSI. Per primary team    Plan/Recommendations  N.p.o. for cardiac catheterization today to define coronary anatomy  Load with 180 mg ticagrelor  Continue aspirin, statin, and beta-blocker  Hold lisinopril pre cardiac catheterization, IV fluids pre and post cath  Up titration of GDMT post procedure  Continue to monitor on telemetry    Subjective  Review of Systems   Constitutional: Negative for chills, malaise/fatigue and weight gain.   Cardiovascular:  Negative for chest pain, dyspnea on exertion, leg swelling, orthopnea, palpitations and syncope.   Respiratory:  Negative for cough, shortness of breath, sleep disturbances due to breathing and sputum production.    Endocrine: Negative.    Hematologic/Lymphatic: Negative.    Gastrointestinal:  Negative for bloating and nausea.   Genitourinary: Negative.    Neurological:  Negative for dizziness, light-headedness and weakness.   Psychiatric/Behavioral:  Negative for altered mental status.    All other systems reviewed and are negative.      Objective:  "  Vitals: Blood pressure 122/68, pulse 66, temperature 97.8 °F (36.6 °C), temperature source Oral, resp. rate 16, height 6' 1\" (1.854 m), weight 102 kg (224 lb 3.3 oz), SpO2 97%., Body mass index is 29.58 kg/m².,     Systolic (24hrs), Av , Min:104 , Max:125     Diastolic (24hrs), Av, Min:60, Max:75      Intake/Output Summary (Last 24 hours) at 2025 0840  Last data filed at 2025 0615  Gross per 24 hour   Intake 103.7 ml   Output 600 ml   Net -496.3 ml     Wt Readings from Last 3 Encounters:   25 102 kg (224 lb 3.3 oz)   25 101 kg (223 lb 5.2 oz)   23 68 kg (150 lb)     Telemetry Review: NSR    EKG personally reviewed by JOSE Tompkins. NSR, inferior TWI    Physical Exam  Vitals reviewed.   Constitutional:       General: He is not in acute distress.  Neck:      Vascular: No hepatojugular reflux or JVD.   Cardiovascular:      Rate and Rhythm: Normal rate and regular rhythm.      Heart sounds: Normal heart sounds. No murmur heard.     No friction rub. No gallop.   Pulmonary:      Effort: Pulmonary effort is normal. No respiratory distress.      Breath sounds: No rales.   Abdominal:      General: Bowel sounds are normal. There is no distension.      Palpations: Abdomen is soft.      Tenderness: There is no abdominal tenderness.   Musculoskeletal:         General: No tenderness. Normal range of motion.      Cervical back: Neck supple.      Right lower leg: No edema.      Left lower leg: No edema.   Skin:     General: Skin is warm and dry.      Findings: No erythema.   Neurological:      Mental Status: He is alert and oriented to person, place, and time.   Psychiatric:         Mood and Affect: Mood normal.         LABORATORY RESULTS      CBC with diff:   Results from last 7 days   Lab Units 25  0618 25  0240   WBC Thousand/uL 8.09 9.27   HEMOGLOBIN g/dL 15.0 14.7   HEMATOCRIT % 45.1 45.1   MCV fL 94 94   PLATELETS Thousands/uL 322 342   RBC Million/uL 4.78 4.82   MCH pg " "31.4 30.5   MCHC g/dL 33.3 32.6   RDW % 13.1 12.7   MPV fL 9.2 9.0   NRBC AUTO /100 WBCs  --  0     CMP:  Results from last 7 days   Lab Units 01/29/25  0618 01/28/25  0240   POTASSIUM mmol/L 4.4 4.1   CHLORIDE mmol/L 103 103   CO2 mmol/L 28 29   BUN mg/dL 19 20   CREATININE mg/dL 1.16 1.14   CALCIUM mg/dL 9.3 9.2   AST U/L  --  27   ALT U/L  --  20   ALK PHOS U/L  --  57   EGFR ml/min/1.73sq m 63 64     BMP:  Results from last 7 days   Lab Units 01/29/25  0618 01/28/25  0240   POTASSIUM mmol/L 4.4 4.1   CHLORIDE mmol/L 103 103   CO2 mmol/L 28 29   BUN mg/dL 19 20   CREATININE mg/dL 1.16 1.14   CALCIUM mg/dL 9.3 9.2     Lab Results   Component Value Date    CREATININE 1.16 01/29/2025    CREATININE 1.14 01/28/2025    CREATININE 1.25 11/17/2015      Results from last 7 days   Lab Units 01/28/25  0722 01/28/25  0240   HEMOGLOBIN A1C % 6.2* 6.4*     Results from last 7 days   Lab Units 01/28/25  0335   INR  1.00     Lipid Profile:   No results found for: \"CHOL\"  Lab Results   Component Value Date    HDL 36 (L) 01/28/2025     Lab Results   Component Value Date    LDLCALC 65 01/28/2025     Lab Results   Component Value Date    TRIG 85 01/28/2025       Meds/Allergies   all current active meds have been reviewed and current meds:   Current Facility-Administered Medications:     acetaminophen (TYLENOL) tablet 650 mg, Q6H PRN    aspirin chewable tablet 81 mg, Daily    atorvastatin (LIPITOR) tablet 80 mg, Daily With Dinner    folic acid (FOLVITE) tablet 1 mg, Daily    heparin (porcine) 25,000 units in 0.45% NaCl 250 mL infusion (premix), Titrated, Last Rate: 15.1 Units/kg/hr (01/30/25 0329)    heparin (porcine) injection 2,000 Units, Q6H PRN    heparin (porcine) injection 4,000 Units, Q6H PRN    insulin lispro (HumALOG/ADMELOG) 100 units/mL subcutaneous injection 1-6 Units, TID AC **AND** Fingerstick Glucose (POCT), TID AC    insulin lispro (HumALOG/ADMELOG) 100 units/mL subcutaneous injection 1-6 Units, HS    [Held by " provider] lisinopril (ZESTRIL) tablet 20 mg, Daily    metoprolol tartrate (LOPRESSOR) tablet 25 mg, Q12H HANNAH    nitroglycerin (NITROSTAT) SL tablet 0.4 mg, Q5 Min PRN    pantoprazole (PROTONIX) EC tablet 20 mg, Early Morning    Medications Prior to Admission:     aspirin 81 mg chewable tablet    Aspirin Buf,CaCarb-MgCarb-MgO, 81 MG TABS    atorvastatin (LIPITOR) 20 mg tablet    clindamycin (CLEOCIN) 300 MG capsule    fenofibrate (TRICOR) 145 mg tablet    Folic Acid 0.8 MG CAPS    lisinopril (ZESTRIL) 20 mg tablet    multivitamin (THERAGRAN) TABS    Omega-3 1000 MG CAPS    omeprazole (PriLOSEC OTC) 20 MG tablet    Potassium 99 MG TABS    heparin (porcine), 3-20 Units/kg/hr (Order-Specific), Last Rate: 15.1 Units/kg/hr (01/30/25 0329)        Counseling / Coordination of Care  Total floor / unit time spent today 20 minutes.  Greater than 50% of total time was spent with the patient and / or family counseling and / or coordination of care.      ** Please Note: Dragon 360 Dictation voice to text software may have been used in the creation of this document. **

## 2025-01-30 NOTE — ASSESSMENT & PLAN NOTE
Presented to [facility] with intermittent burning CP. Occurring on/off x 3 weeks, progressively getting worse/longer  ECG- nonspecific ST changes  Troponin up to 5162  Echocardiogram: LVEF 45% with inferior wall hypokinesis. Mild AS and trace MR  On IV heparin, ASA, statin, and beta blocker

## 2025-01-30 NOTE — PROGRESS NOTES
Progress Note - Hospitalist   Name: Brandon Thompson 69 y.o. male I MRN: [id number]  Unit/Bed#: PPHP-326-01 I Date of Admission: 1/29/2025   Date of Service: 1/30/2025 I Hospital Day: 1    Assessment & Plan  NSTEMI (non-ST elevated myocardial infarction) (HCC)  Presented to [facility] due to 3 to 4 weeks of nonradiating burning chest pain  Seen by cardiology, EKG with subtle abnormalities, echo with mildly reduced ejection fraction at 45% grade 1 diastolic dysfunction  Troponin leak up to 5100  Transferred to [facility] for cath    Continue heparin drip  Continue aspirin, Lipitor, Lopressor 25 mg twice daily  N.p.o. for cardiac cath today  Essential hypertension  Home regimen is lisinopril 20mg , metoprolol was started in the hospital  Continue both medications  Continue to monitor  Hyperlipidemia  On fenofibrate, omega-3 and statin, continue  Type 2 diabetes mellitus without complication, without long-term current use of insulin ([facility])  Hemoglobin A1c 6.2%  Not on any medications outpatient  Continue sliding scale insulin  Abnormal CT scan  CT scan at [facility] showed findings consistent with interstitial lung disease    Monitor O2 saturations  Pulm eval outpatient    VTE Pharmacologic Prophylaxis:   Moderate Risk (Score 3-4) - Pharmacological DVT Prophylaxis Ordered: heparin drip.    Mobility:   Basic Mobility Inpatient Raw Score: 24  JH-HLM Goal: 8: Walk 250 feet or more  JH-HLM Achieved: 7: Walk 25 feet or more  JH-HLM Goal NOT achieved. Continue with multidisciplinary rounding and encourage appropriate mobility to improve upon JH-HLM goals.    Patient Centered Rounds: I performed bedside rounds with nursing staff today.   Discussions with Specialists or Other Care Team Provider: LAWRENCE. Cardiology    Education and Discussions with Family / Patient: Patient declined call to .     Current Length of Stay: 1 day(s)  Current Patient Status: Inpatient   Certification Statement: The patient will continue to  require additional inpatient hospital stay due to cardiac catheterization  Discharge Plan: Anticipate discharge tomorrow to home.    Code Status: Level 1 - Full Code    Subjective   Patient seen and examined.  He is feeling okay this morning.  Denies any overt chest pain or shortness of breath.  Stable on telemetry.  No events overnight.    Objective :  Temp:  [97.8 °F (36.6 °C)-99.1 °F (37.3 °C)] 97.8 °F (36.6 °C)  HR:  [66-85] 66  BP: (104-125)/(60-75) 122/68  Resp:  [16-21] 16  SpO2:  [96 %-97 %] 97 %  O2 Device: None (Room air)    Body mass index is 29.58 kg/m².     Input and Output Summary (last 24 hours):     Intake/Output Summary (Last 24 hours) at 1/30/2025 0935  Last data filed at 1/30/2025 0615  Gross per 24 hour   Intake 103.7 ml   Output 600 ml   Net -496.3 ml       PHYSICAL EXAM:    Vitals signs reviewed  Constitutional   Awake and cooperative. NAD.   Head/Neck   Normocephalic. Atraumatic.   HEENT   No scleral icterus. EOMI.   Heart   Regular rate and rhythm. No murmers.   Lungs   Clear to auscultation bilaterally. Respirations unlaboured.   Abdomen   Soft. Nontender. Nondistended.    Skin   Skin color normal. No rashes.   Extremities   No deformities. No peripheral edema.   Neuro   Alert and oriented. No new deficits.   Psych   Mood stable. Affect normal.         Lines/Drains:        Telemetry:  Telemetry Orders (From admission, onward)               24 Hour Telemetry Monitoring  Continuous x 24 Hours (Telem)        Expiring   Question:  Reason for 24 Hour Telemetry  Answer:  PCI/EP study (including pacer and ICD implementation), Cardiac surgery, MI, abnormal cardiac cath, and chest pain- rule out MI                     Telemetry Reviewed: Normal Sinus Rhythm  Indication for Continued Telemetry Use: Acute MI/Unstable Angina/Rule out ACS               Lab Results: I have reviewed the following results:   Results from last 7 days   Lab Units 01/29/25  0618 01/28/25  0240   WBC Thousand/uL 8.09 9.27    HEMOGLOBIN g/dL 15.0 14.7   HEMATOCRIT % 45.1 45.1   PLATELETS Thousands/uL 322 342   SEGS PCT %  --  58   LYMPHO PCT %  --  26   MONO PCT %  --  12   EOS PCT %  --  3     Results from last 7 days   Lab Units 01/29/25  0618 01/28/25  0240   SODIUM mmol/L 137 138   POTASSIUM mmol/L 4.4 4.1   CHLORIDE mmol/L 103 103   CO2 mmol/L 28 29   BUN mg/dL 19 20   CREATININE mg/dL 1.16 1.14   ANION GAP mmol/L 6 6   CALCIUM mg/dL 9.3 9.2   ALBUMIN g/dL  --  4.1   TOTAL BILIRUBIN mg/dL  --  0.54   ALK PHOS U/L  --  57   ALT U/L  --  20   AST U/L  --  27   GLUCOSE RANDOM mg/dL 105 120     Results from last 7 days   Lab Units 01/28/25  0335   INR  1.00     Results from last 7 days   Lab Units 01/30/25  0550 01/29/25  2210 01/29/25  1621 01/29/25  1115 01/29/25  0813 01/28/25  2212 01/28/25  1554 01/28/25  0904   POC GLUCOSE mg/dl 112 119 102 104 88 109 101 117     Results from last 7 days   Lab Units 01/28/25  0722 01/28/25  0240   HEMOGLOBIN A1C % 6.2* 6.4*           Recent Cultures (last 7 days):         Imaging Results Review: No pertinent imaging studies reviewed.  Other Study Results Review: No additional pertinent studies reviewed.    Last 24 Hours Medication List:     Current Facility-Administered Medications:     acetaminophen (TYLENOL) tablet 650 mg, Q6H PRN    aspirin chewable tablet 243 mg, Once    aspirin chewable tablet 81 mg, Daily    atorvastatin (LIPITOR) tablet 80 mg, Daily With Dinner    folic acid (FOLVITE) tablet 1 mg, Daily    heparin (porcine) 25,000 units in 0.45% NaCl 250 mL infusion (premix), Titrated, Last Rate: 15.1 Units/kg/hr (01/30/25 0329)    heparin (porcine) injection 2,000 Units, Q6H PRN    heparin (porcine) injection 4,000 Units, Q6H PRN    insulin lispro (HumALOG/ADMELOG) 100 units/mL subcutaneous injection 1-6 Units, TID AC **AND** Fingerstick Glucose (POCT), TID AC    insulin lispro (HumALOG/ADMELOG) 100 units/mL subcutaneous injection 1-6 Units, HS    [Held by provider] lisinopril (ZESTRIL)  tablet 20 mg, Daily    metoprolol tartrate (LOPRESSOR) tablet 25 mg, Q12H HANNAH    nitroglycerin (NITROSTAT) SL tablet 0.4 mg, Q5 Min PRN    pantoprazole (PROTONIX) EC tablet 20 mg, Early Morning    sodium chloride 0.9 % infusion, Continuous    ticagrelor (BRILINTA) tablet 180 mg, Once    Administrative Statements   Today, Patient Was Seen By: Mushtaq Pyle, DO  I have spent a total time of 35 minutes in caring for this patient on the day of the visit/encounter including Diagnostic results, Prognosis, Risks and benefits of tx options, Instructions for management, Patient and family education, Importance of tx compliance, Risk factor reductions, Impressions, Counseling / Coordination of care, Documenting in the medical record, Reviewing / ordering tests, medicine, procedures  , Obtaining or reviewing history  , and Communicating with other healthcare professionals .    **Please Note: This note may have been constructed using a voice recognition system.**

## 2025-01-30 NOTE — ASSESSMENT & PLAN NOTE
Presented to [facility] due to 3 to 4 weeks of nonradiating burning chest pain  Seen by cardiology, EKG with subtle abnormalities, echo with mildly reduced ejection fraction at 45% grade 1 diastolic dysfunction  Troponin leak up to 5100  Transferred to [facility] for cath    Plan:  Consult cardiology  Continue heparin drip  Continue aspirin, Lipitor, Lopressor 25 mg twice daily  N.p.o. at midnight

## 2025-01-30 NOTE — ASSESSMENT & PLAN NOTE
Home regimen is lisinopril 20mg , metoprolol was started in the hospital  We will continue both medications   Slit Excision Additional Text (Leave Blank If You Do Not Want): A linear line was drawn on the skin overlying the lesion. An incision was made slowly until the lesion was visualized.  Once visualized, the lesion was removed with blunt dissection.

## 2025-01-30 NOTE — ASSESSMENT & PLAN NOTE
CT scan at [place] showed findings consistent with interstitial lung disease    Monitor O2 saturations  Pulm eval outpatient

## 2025-01-30 NOTE — ASSESSMENT & PLAN NOTE
Lab Results   Component Value Date    HGBA1C 6.2 (H) 01/28/2025       Recent Labs     01/28/25  2212 01/29/25  0813 01/29/25  1115 01/29/25  1621   POCGLU 109 88 104 102       Blood Sugar Average: Last 72 hrs:    Not on any medications outpatient    Continue sliding scale insulin

## 2025-01-30 NOTE — ASSESSMENT & PLAN NOTE
CT scan at [place] showed findings consistent with interstitial lung disease    Monitor O2 saturations  Pulm eval outpatient

## 2025-01-31 ENCOUNTER — NURSE TRIAGE (OUTPATIENT)
Age: 70
End: 2025-01-31

## 2025-01-31 VITALS
TEMPERATURE: 98.2 F | HEIGHT: 73 IN | OXYGEN SATURATION: 98 % | SYSTOLIC BLOOD PRESSURE: 132 MMHG | DIASTOLIC BLOOD PRESSURE: 68 MMHG | HEART RATE: 66 BPM | WEIGHT: 224.21 LBS | BODY MASS INDEX: 29.72 KG/M2 | RESPIRATION RATE: 13 BRPM

## 2025-01-31 PROBLEM — I25.5 ISCHEMIC CARDIOMYOPATHY: Status: ACTIVE | Noted: 2025-01-31

## 2025-01-31 LAB
ANION GAP SERPL CALCULATED.3IONS-SCNC: 7 MMOL/L (ref 4–13)
BUN SERPL-MCNC: 23 MG/DL (ref 5–25)
CALCIUM SERPL-MCNC: 9.3 MG/DL (ref 8.4–10.2)
CHLORIDE SERPL-SCNC: 99 MMOL/L (ref 96–108)
CO2 SERPL-SCNC: 27 MMOL/L (ref 21–32)
CREAT SERPL-MCNC: 1.4 MG/DL (ref 0.6–1.3)
ERYTHROCYTE [DISTWIDTH] IN BLOOD BY AUTOMATED COUNT: 12.8 % (ref 11.6–15.1)
GFR SERPL CREATININE-BSD FRML MDRD: 50 ML/MIN/1.73SQ M
GLUCOSE SERPL-MCNC: 108 MG/DL (ref 65–140)
GLUCOSE SERPL-MCNC: 84 MG/DL (ref 65–140)
GLUCOSE SERPL-MCNC: 93 MG/DL (ref 65–140)
HCT VFR BLD AUTO: 46.5 % (ref 36.5–49.3)
HGB BLD-MCNC: 15 G/DL (ref 12–17)
MAGNESIUM SERPL-MCNC: 2.1 MG/DL (ref 1.9–2.7)
MCH RBC QN AUTO: 30.6 PG (ref 26.8–34.3)
MCHC RBC AUTO-ENTMCNC: 32.3 G/DL (ref 31.4–37.4)
MCV RBC AUTO: 95 FL (ref 82–98)
PLATELET # BLD AUTO: 332 THOUSANDS/UL (ref 149–390)
PMV BLD AUTO: 9.1 FL (ref 8.9–12.7)
POTASSIUM SERPL-SCNC: 4.4 MMOL/L (ref 3.5–5.3)
RBC # BLD AUTO: 4.9 MILLION/UL (ref 3.88–5.62)
SODIUM SERPL-SCNC: 133 MMOL/L (ref 135–147)
WBC # BLD AUTO: 8.99 THOUSAND/UL (ref 4.31–10.16)

## 2025-01-31 PROCEDURE — 99239 HOSP IP/OBS DSCHRG MGMT >30: CPT | Performed by: INTERNAL MEDICINE

## 2025-01-31 PROCEDURE — 83735 ASSAY OF MAGNESIUM: CPT | Performed by: STUDENT IN AN ORGANIZED HEALTH CARE EDUCATION/TRAINING PROGRAM

## 2025-01-31 PROCEDURE — 82948 REAGENT STRIP/BLOOD GLUCOSE: CPT

## 2025-01-31 PROCEDURE — 85027 COMPLETE CBC AUTOMATED: CPT | Performed by: STUDENT IN AN ORGANIZED HEALTH CARE EDUCATION/TRAINING PROGRAM

## 2025-01-31 PROCEDURE — 80048 BASIC METABOLIC PNL TOTAL CA: CPT | Performed by: STUDENT IN AN ORGANIZED HEALTH CARE EDUCATION/TRAINING PROGRAM

## 2025-01-31 PROCEDURE — 99232 SBSQ HOSP IP/OBS MODERATE 35: CPT | Performed by: INTERNAL MEDICINE

## 2025-01-31 RX ORDER — HEPARIN SODIUM 5000 [USP'U]/ML
5000 INJECTION, SOLUTION INTRAVENOUS; SUBCUTANEOUS EVERY 8 HOURS SCHEDULED
Status: DISCONTINUED | OUTPATIENT
Start: 2025-01-31 | End: 2025-01-31 | Stop reason: HOSPADM

## 2025-01-31 RX ORDER — METOPROLOL SUCCINATE 50 MG/1
50 TABLET, EXTENDED RELEASE ORAL DAILY
Qty: 30 TABLET | Refills: 0 | Status: SHIPPED | OUTPATIENT
Start: 2025-02-01

## 2025-01-31 RX ORDER — METOPROLOL SUCCINATE 50 MG/1
50 TABLET, EXTENDED RELEASE ORAL DAILY
Status: DISCONTINUED | OUTPATIENT
Start: 2025-01-31 | End: 2025-01-31 | Stop reason: HOSPADM

## 2025-01-31 RX ORDER — NITROGLYCERIN 0.4 MG/1
0.4 TABLET SUBLINGUAL
Qty: 10 TABLET | Refills: 0 | Status: SHIPPED | OUTPATIENT
Start: 2025-01-31

## 2025-01-31 RX ORDER — ATORVASTATIN CALCIUM 80 MG/1
80 TABLET, FILM COATED ORAL
Qty: 30 TABLET | Refills: 0 | Status: SHIPPED | OUTPATIENT
Start: 2025-01-31

## 2025-01-31 RX ORDER — LISINOPRIL 10 MG/1
10 TABLET ORAL DAILY
Qty: 30 TABLET | Refills: 0 | Status: SHIPPED | OUTPATIENT
Start: 2025-02-01

## 2025-01-31 RX ADMIN — FOLIC ACID 1 MG: 1 TABLET ORAL at 09:25

## 2025-01-31 RX ADMIN — TICAGRELOR 90 MG: 90 TABLET ORAL at 09:26

## 2025-01-31 RX ADMIN — PANTOPRAZOLE SODIUM 20 MG: 20 TABLET, DELAYED RELEASE ORAL at 06:05

## 2025-01-31 RX ADMIN — ASPIRIN 81 MG CHEWABLE TABLET 81 MG: 81 TABLET CHEWABLE at 09:25

## 2025-01-31 RX ADMIN — METOPROLOL SUCCINATE 50 MG: 50 TABLET, FILM COATED, EXTENDED RELEASE ORAL at 09:26

## 2025-01-31 NOTE — ASSESSMENT & PLAN NOTE
1/23/2024         RE: Reilly Borja  [address] [address]        Dear Colleague,    Thank you for referring your patient, Reilly Borja, to the [facility] [place]. Please see a copy of my visit note below.    Chief complaint: Status post left talonavicular and calcaneocuboid joint fusions performed on September 13, 2023    Patient presents today for further follow-up in the company of his wife.  Reports without cardiac pain and discomfort across the ankle joint.    Today he has gotten evaluation by one of our PAs a approximately 10 days ago where plain x-rays were obtained and he was diagnosed with collapse of the ankle joint.  Therefore he presents today for further evaluation.    On today's exam he does not have any obvious signs of Charcot.  There is no redness there is no warmth however there is some swelling across the ankle and the foot.  This is unchanged from before.  Overall the gross alignment of the ankle and the foot is quite good.    Plain x-rays from a previous visit were reviewed today which are significant for showing fact that complete collapse of the ankle with destruction of the dome of the talus.  This is highly compatible with a Charcot foot in spite of the absence of any clinical symptoms.    Assessment: Status post left talonavicular and calcaneocuboid joint fusion in the presence of ankle collapse    Plan: I discussed with the patient and his wife that at this point options are to proceed with the use of a PTB brace versus a reconstruction of the ankle versus a below the knee amputation.    Attending working to proceed with the use of the brace and to see how much we can improve his symptoms.    All questions were answered.  Patient was pleased discussion.  He will follow-up on as needed basis.      TT 20 minutes        Orlando Duarte MD   Hemoglobin A1c 6.2%  Not on any medications outpatient

## 2025-01-31 NOTE — ASSESSMENT & PLAN NOTE
CT scan at [place] showed findings consistent with interstitial lung disease    Monitor O2 saturations  Asymptomatic. Follow up outpatient with PCP

## 2025-01-31 NOTE — ASSESSMENT & PLAN NOTE
Prior to admission taking lisinopril 20 mg daily - held pre/post [facility]  Started on metoprolol tartrate 25 mg BID this admission  BP controlled

## 2025-01-31 NOTE — ASSESSMENT & PLAN NOTE
LVEF 45% with inferior wall hypokinesis  GDMT- metoprolol tartrate 25 mg BID, lisinopril 20 mg daily held pre/post [facility]

## 2025-01-31 NOTE — PROGRESS NOTES
Progress Note - Cardiology   Name: Brandon Thompson 69 y.o. male I MRN: [id number]  Unit/Bed#: PPHP-326-01 I Date of Admission: 1/29/2025   Date of Service: 1/31/2025 I Hospital Day: 2     Assessment & Plan  NSTEMI (non-ST elevated myocardial infarction) (HCC)  Presented to [facility] with intermittent burning CP. Occurring on/off x 3 weeks, progressively getting worse/longer  ECG- nonspecific ST changes  Troponin up to 5162  Echocardiogram: LVEF 45% with inferior wall hypokinesis. Mild AS and trace MR  [facility] 1/30/25: 90% mid RCA stenosis s/p PCI/IMKE x 1  DAPT ASA and ticagrelor- pt okay with cost of around $100/month  Ischemic cardiomyopathy  LVEF 45% with inferior wall hypokinesis  GDMT- metoprolol tartrate 25 mg BID, lisinopril 20 mg daily held pre/post [facility]  Essential hypertension  Prior to admission taking lisinopril 20 mg daily - held pre/post [facility]  Started on metoprolol tartrate 25 mg BID this admission  BP controlled  Hyperlipidemia  , LDL 65, HDL 36 on atorvastatin 20 mg daily and Tricor as an outpatient. Atorvastatin increased to 80 mg daily.  Pre diabetes  Lab Results   Component Value Date    HGBA1C 6.2 (H) 01/28/2025   On SSI. Per primary team    Plan/Recommendations  Continue DAPT with ASA and ticagrelor  GDMT- Toprol XL 50 mg daily. Decrease lisinopril to 10 mg daily starting tomorrow  Continue high intensity statin  Check BMP early next week  Cardiac rehab referral placed for d/c  Outpatient follow up arranged  Stable for d/c home today    Subjective  Review of Systems   Constitutional: Negative for chills, malaise/fatigue and weight gain.   Cardiovascular:  Negative for chest pain, dyspnea on exertion, leg swelling, orthopnea, palpitations and syncope.   Respiratory:  Negative for cough, shortness of breath, sleep disturbances due to breathing and sputum production.    Endocrine: Negative.    Hematologic/Lymphatic: Negative.    Gastrointestinal:  Negative for bloating and nausea.   Genitourinary:  "Negative.    Neurological:  Negative for dizziness, light-headedness and weakness.   Psychiatric/Behavioral:  Negative for altered mental status.    All other systems reviewed and are negative.      Objective:   Vitals: Blood pressure 124/70, pulse 69, temperature 97.9 °F (36.6 °C), resp. rate 13, height 6' 1\" (1.854 m), weight 102 kg (224 lb 3.3 oz), SpO2 95%., Body mass index is 29.58 kg/m².,     Systolic (24hrs), Av , Min:91 , Max:128     Diastolic (24hrs), Av, Min:52, Max:76      Intake/Output Summary (Last 24 hours) at 2025 0857  Last data filed at 2025 0835  Gross per 24 hour   Intake 2170 ml   Output 1775 ml   Net 395 ml     Wt Readings from Last 3 Encounters:   25 102 kg (224 lb 3.3 oz)   25 101 kg (223 lb 5.2 oz)   23 68 kg (150 lb)     Telemetry Review: NSR    Physical Exam  Vitals reviewed.   Constitutional:       General: He is not in acute distress.  Neck:      Vascular: No hepatojugular reflux or JVD.   Cardiovascular:      Rate and Rhythm: Normal rate and regular rhythm.      Heart sounds: Normal heart sounds. No murmur heard.     No friction rub. No gallop.   Pulmonary:      Effort: Pulmonary effort is normal. No respiratory distress.      Breath sounds: No rales.   Abdominal:      General: Bowel sounds are normal. There is no distension.      Palpations: Abdomen is soft.      Tenderness: There is no abdominal tenderness.   Musculoskeletal:         General: No tenderness. Normal range of motion.      Cervical back: Neck supple.      Right lower leg: No edema.      Left lower leg: No edema.   Skin:     General: Skin is warm and dry.      Capillary Refill: Capillary refill takes 2 to 3 seconds.      Findings: No erythema.   Neurological:      Mental Status: He is alert and oriented to person, place, and time.   Psychiatric:         Mood and Affect: Mood normal.       LABORATORY RESULTS      CBC with diff:   Results from last 7 days   Lab Units 25  2446 " "01/29/25  0618 01/28/25  0240   WBC Thousand/uL 8.99 8.09 9.27   HEMOGLOBIN g/dL 15.0 15.0 14.7   HEMATOCRIT % 46.5 45.1 45.1   MCV fL 95 94 94   PLATELETS Thousands/uL 332 322 342   RBC Million/uL 4.90 4.78 4.82   MCH pg 30.6 31.4 30.5   MCHC g/dL 32.3 33.3 32.6   RDW % 12.8 13.1 12.7   MPV fL 9.1 9.2 9.0   NRBC AUTO /100 WBCs  --   --  0     CMP:  Results from last 7 days   Lab Units 01/31/25 0446 01/30/25  0914 01/29/25 0618 01/28/25  0240   POTASSIUM mmol/L 4.4 4.2 4.4 4.1   CHLORIDE mmol/L 99 103 103 103   CO2 mmol/L 27 28 28 29   BUN mg/dL 23 21 19 20   CREATININE mg/dL 1.40* 1.26 1.16 1.14   CALCIUM mg/dL 9.3 9.8 9.3 9.2   AST U/L  --   --   --  27   ALT U/L  --   --   --  20   ALK PHOS U/L  --   --   --  57   EGFR ml/min/1.73sq m 50 57 63 64     BMP:  Results from last 7 days   Lab Units 01/31/25 0446 01/30/25  0914 01/29/25 0618 01/28/25  0240   POTASSIUM mmol/L 4.4 4.2 4.4 4.1   CHLORIDE mmol/L 99 103 103 103   CO2 mmol/L 27 28 28 29   BUN mg/dL 23 21 19 20   CREATININE mg/dL 1.40* 1.26 1.16 1.14   CALCIUM mg/dL 9.3 9.8 9.3 9.2     Lab Results   Component Value Date    CREATININE 1.40 (H) 01/31/2025    CREATININE 1.26 01/30/2025    CREATININE 1.16 01/29/2025        Results from last 7 days   Lab Units 01/31/25  0446   MAGNESIUM mg/dL 2.1     Results from last 7 days   Lab Units 01/28/25  0722 01/28/25  0240   HEMOGLOBIN A1C % 6.2* 6.4*     Results from last 7 days   Lab Units 01/28/25  0335   INR  1.00       Lipid Profile:   No results found for: \"CHOL\"  Lab Results   Component Value Date    HDL 36 (L) 01/28/2025     Lab Results   Component Value Date    LDLCALC 65 01/28/2025     Lab Results   Component Value Date    TRIG 85 01/28/2025       Meds/Allergies   all current active meds have been reviewed and current meds:   Current Facility-Administered Medications:     acetaminophen (TYLENOL) tablet 650 mg, Q6H PRN    aspirin chewable tablet 81 mg, Daily    atorvastatin (LIPITOR) tablet 80 mg, Daily With " Dinner    folic acid (FOLVITE) tablet 1 mg, Daily    heparin (porcine) subcutaneous injection 5,000 Units, Q8H HANNAH    insulin lispro (HumALOG/ADMELOG) 100 units/mL subcutaneous injection 1-6 Units, TID AC **AND** Fingerstick Glucose (POCT), TID AC    insulin lispro (HumALOG/ADMELOG) 100 units/mL subcutaneous injection 1-6 Units, HS    metoprolol tartrate (LOPRESSOR) tablet 25 mg, Q12H HANNAH    nitroglycerin (NITROSTAT) SL tablet 0.4 mg, Q5 Min PRN    pantoprazole (PROTONIX) EC tablet 20 mg, Early Morning    ticagrelor (BRILINTA) tablet 90 mg, Q12H HANNAH    Medications Prior to Admission:     aspirin 81 mg chewable tablet    Aspirin Buf,CaCarb-MgCarb-MgO, 81 MG TABS    atorvastatin (LIPITOR) 20 mg tablet    clindamycin (CLEOCIN) 300 MG capsule    fenofibrate (TRICOR) 145 mg tablet    Folic Acid 0.8 MG CAPS    lisinopril (ZESTRIL) 20 mg tablet    multivitamin (THERAGRAN) TABS    Omega-3 1000 MG CAPS    omeprazole (PriLOSEC OTC) 20 MG tablet    Potassium 99 MG TABS         Counseling / Coordination of Care  Total floor / unit time spent today 20 minutes.  Greater than 50% of total time was spent with the patient and / or family counseling and / or coordination of care.      ** Please Note: Dragon 360 Dictation voice to text software may have been used in the creation of this document. **

## 2025-01-31 NOTE — TELEPHONE ENCOUNTER
Received a call today pt was discharged today. On his medication list he was taken ing atorvastatin 20 mg and it was changed to atorvastatin  80 mg.   They were told to double the dosage, which he was taking the 20 mg and then it would be 40 mg of atorvastatin.     Then had wanted to double check cause he had only been taking 20 mg of atorvastatin.    Advised yes that it was changed to Atorvastatin 80 mg today per note, and discharge    Wife would like to make sure that is the correct dosage.   Also reached out to Dr Baker and agreed.   Reached out to Rosy Felton via secure chat, discussed.nternal medicine discharged him but it is appropriate for him to be on 40 mg  Called and spoke to wife and advised can take 40 mg of atorvastatin. Verbally understood     Pt has a cardiology appt on 2/6/2025

## 2025-01-31 NOTE — ASSESSMENT & PLAN NOTE
Home lisinopril was decreased to 10 mg daily  Toprol succinate 50 mg daily added  Outpatient follow-up with PCP and cardiology

## 2025-01-31 NOTE — DISCHARGE INSTR - AVS FIRST PAGE
1. Please see the post cardiac catheterization/ angioseal closure device instructions and stent booklet. No heavy lifting, greater than 10 lbs. or strenuous  activity for 48 hrs.  See the post cardiac catheterization/ angioseal closure device instructions.     2.Remove band aid tomorrow.  Shower and wash area- wrist gently with soap and water- beginning tomorrow. Rinse and pat dry.  Apply new water seal band aid.  Repeat this process for 5 days. No powders, creams lotions or antibiotic ointments  for 5 days.  No tub baths, hot tubs or swimming for 5 days.     3.No driving until Saturday 2/1/25    4. Do not stop aspirin or Brilinta (ticagrelor) for any reason without a cardiologist’s consent, or the stent could block up and cause a heart attack.

## 2025-01-31 NOTE — ASSESSMENT & PLAN NOTE
Presented to [facility] due to 3 to 4 weeks of nonradiating burning chest pain  Seen by cardiology, EKG with subtle abnormalities, echo with mildly reduced ejection fraction at 45% grade 1 diastolic dysfunction  Troponin leak up to 5100  Transferred to [facility] for cath    Cardiac catheterization 1/30: 90% stenosis of the mid RCA; status post MIKE x 1  Patient tolerated procedure well and was feeling at baseline prior to discharge  On discharge:  Lisinopril decreased to 10 mg daily  Metoprolol succinate 50 mg daily added  Continue daily baby aspirin  Atorvastatin increased to 80 mg daily  Brilinta 90 mg twice daily added.  Outpatient follow-up with cardiology scheduled for 2/6.

## 2025-01-31 NOTE — ASSESSMENT & PLAN NOTE
Presented to [facility] with intermittent burning CP. Occurring on/off x 3 weeks, progressively getting worse/longer  ECG- nonspecific ST changes  Troponin up to 5162  Echocardiogram: LVEF 45% with inferior wall hypokinesis. Mild AS and trace MR  [facility] 1/30/25: 90% mid RCA stenosis s/p PCI/MIKE x 1  DAPT ASA and ticagrelor- pt okay with cost of around $100/month

## 2025-01-31 NOTE — DISCHARGE SUMMARY
Discharge Summary - Hospitalist   Name: Brandon Thompson 69 y.o. male I MRN: [id number]  Unit/Bed#: PPHP-326-01 I Date of Admission: 1/29/2025   Date of Service: 1/31/2025 I Hospital Day: 2     Assessment & Plan  NSTEMI (non-ST elevated myocardial infarction) (HCC)  Presented to [facility] due to 3 to 4 weeks of nonradiating burning chest pain  Seen by cardiology, EKG with subtle abnormalities, echo with mildly reduced ejection fraction at 45% grade 1 diastolic dysfunction  Troponin leak up to 5100  Transferred to [facility] for cath    Cardiac catheterization 1/30: 90% stenosis of the mid RCA; status post MIKE x 1  Patient tolerated procedure well and was feeling at baseline prior to discharge  On discharge:  Lisinopril decreased to 10 mg daily  Metoprolol succinate 50 mg daily added  Continue daily baby aspirin  Atorvastatin increased to 80 mg daily  Brilinta 90 mg twice daily added.  Outpatient follow-up with cardiology scheduled for 2/6.  Essential hypertension  Home lisinopril was decreased to 10 mg daily  Toprol succinate 50 mg daily added  Outpatient follow-up with PCP and cardiology  Hyperlipidemia  On fenofibrate, omega-3 and statin, continue  Pre diabetes  Hemoglobin A1c 6.2%  Not on any medications outpatient  Abnormal CT scan  CT scan at [facility] showed findings consistent with interstitial lung disease    Monitor O2 saturations  Asymptomatic. Follow up outpatient with PCP      Medical Problems       Resolved Problems  Date Reviewed: 1/31/2025   None       Discharging Physician / Practitioner: Mushtaq Pyle DO  PCP: Gi Levi MD  Admission Date:   Admission Orders (From admission, onward)       Ordered        01/29/25 2152  INPATIENT ADMISSION  Once                          Discharge Date: 01/31/25    Consultations During Hospital Stay:  Cardiology  Interventional Cardiology    Procedures Performed:   Cardiac catheterization  Mid RCA 90% stenosis, s/p MIKE x 1    Significant Findings /  "Test Results:   NSTEMI with mid RCA culprit lesion    Incidental Findings:   none    Test Results Pending at Discharge (will require follow up):   none     Outpatient Tests Requested:  none    Complications:  none    Reason for Admission: NSTEMI    Hospital Course:   Brandon Thompson is a 69 y.o. male patient who originally presented to the hospital on 1/29/2025 due to 3 to 4 weeks of intermittent nonradiating chest pain.  In the [facility] emergency room he was found to have elevation in troponin and was transferred to [facility] for evaluation of NSTEMI.  On 1/30 he underwent cardiac catheterization which revealed a culprit lesion in the mid RCA which was percent stenosed.  He underwent drug-eluting stent placement x 1 without complication.  Patient did well thereafter and remained asymptomatic.  He will be on dual antiplatelet therapy with aspirin and Brilinta, as well as high intensity statin and beta-blocker on discharge.  He has follow-up with cardiology scheduled on 2/6.  Otherwise patient's hospitalization was uncomplicated.  All questions and concerns were addressed prior to discharge.  He was eager to return home.    Please see above list of diagnoses and related plan for additional information.     Condition at Discharge: good    Discharge Day Visit / Exam:   Subjective:   Patient seen and examined on the day of discharge.  He is feeling well today.  No events overnight.  Denies any chest pain or shortness of breath.  No lightheadedness or dizziness.  Vitals: Blood Pressure: 132/68 (01/31/25 1109)  Pulse: 66 (01/31/25 1109)  Temperature: 98.2 °F (36.8 °C) (01/31/25 1109)  Temp Source: Oral (01/30/25 1217)  Respirations: 13 (01/31/25 0241)  Height: 6' 1\" (185.4 cm) (01/29/25 2143)  Weight - Scale: 102 kg (224 lb 3.3 oz) (01/29/25 2143)  SpO2: 98 % (01/31/25 1109)  PHYSICAL EXAM:    Vitals signs reviewed  Constitutional   Awake and cooperative. NAD.   Head/Neck   Normocephalic. " Atraumatic.   HEENT   No scleral icterus. EOMI.   Heart   Regular rate and rhythm. No murmers.   Lungs   Clear to auscultation bilaterally. Respirations unlaboured.   Abdomen   Soft. Nontender. Nondistended.    Skin   Skin color normal. No rashes.   Extremities   No deformities. No peripheral edema.   Neuro   Alert and oriented. No new deficits.   Psych   Mood stable. Affect normal.         Discussion with Family: Patient declined call to .     Discharge instructions/Information to patient and family:   See after visit summary for information provided to patient and family.      Provisions for Follow-Up Care:  See after visit summary for information related to follow-up care and any pertinent home health orders.      Mobility at time of Discharge:   Basic Mobility Inpatient Raw Score: 24  JH-HLM Goal: 8: Walk 250 feet or more  JH-HLM Achieved: 7: Walk 25 feet or more  HLM Goal NOT achieved. Continue to encourage mobility in post discharge setting.     Disposition:   Home    Planned Readmission: No    Discharge Medications:  See after visit summary for reconciled discharge medications provided to patient and/or family.      Administrative Statements   Discharge Statement:  I have spent a total time of 45 minutes in caring for this patient on the day of the visit/encounter. >30 minutes of time was spent on: Diagnostic results, Prognosis, Risks and benefits of tx options, Instructions for management, Patient and family education, Importance of tx compliance, Risk factor reductions, Impressions, Counseling / Coordination of care, Documenting in the medical record, Reviewing / ordering tests, medicine, procedures  , and Communicating with other healthcare professionals .    **Please Note: This note may have been constructed using a voice recognition system**

## 2025-02-05 NOTE — PROGRESS NOTES
Cardiology Follow Up    Brandon Thompson  1955  [id number]  [facility] [place] [id number] [place] [id number] [phone]    Assessment & Plan  S/P drug eluting coronary stent placement   1/30/25 status post angioplasty with [place] frontier drug-eluting stent placed across the 90% stenosis, 0% residual stenosis.   Continue on asked me 1 mg daily, Brilinta 90 mg twice daily, he was instructed not to stop for any reason.  Continue metoprolol succinate 50 mg daily, lisinopril 10 mg daily, Lipitor 40 mg daily, Tricor 145 mg daily, heart healthy diet.  Cardiac rehabilitation in the near future.  Ischemic cardiomyopathy  1/29/25 LVEF 45% LVIDd 5.3cm  Continue on metoprolol 50 mg daily, lisinopril 10 mg daily, follow-up limited TTE in 3 months  2gm sodium diet, instructed to monitor for SS of HF and when to call our office   Mixed hyperlipidemia  1/28/25 , TG 85, HDL 36, LDL 65  Continue Lipitor 40 mg daily, Tricor 145 mg daily heart healthy diet.  Follow-up fasting lipid profile in 3 months  Stage 3b chronic kidney disease (HCC)  Lab Results   Component Value Date    EGFR 50 01/31/2025    EGFR 57 01/30/2025    EGFR 63 01/29/2025    CREATININE 1.40 (H) 01/31/2025    CREATININE 1.26 01/30/2025    CREATININE 1.16 01/29/2025   Follow up with PCP,  JODI in one month   Stop PPI     Interval History:   On 1/28 - 1/29 Mr Brandon Thompson presented to [facility] with epigastric pain.   EKG min elevated ST and T wave inversion in III.  Toponin 5162.  He was transferred to [facility] for a cardiac catheterization.  Brandon was admitted to [facility] on 1/29 - 1/31/25 with a NSTEMI.  TTE Showed left ventricular cavity size normal.  Left ventricular cavity size normal LVEF 45% LVIDd 5.3cm  grade 1 abnormal relaxation.  Akinetic basal inferior wall.  Hypokinetic basal inferoseptal mid inferior and inferolateral  walls.  All others segments normal.  Right ventricular size systolic function normal.  Aortic valve trileaflet mild stenosis ALLIE 1.93 cm².  Trace MR, trace TR.  Pericardium normal in appearance. 1/20/24 Cardiac catheterization showed mid LAD 30% stenosis, mid RCA 90% stenosis, status post angioplasty with [place] frontier drug-eluting stent placed across the 90% stenosis, 0% residual stenosis.  He was discharged on aspirin 81 mg daily, Brilinta 90 mg twice daily, lisinopril 10 mg daily, metoprolol succinate 50 mg daily.  Cardiac rehabilitation ordered at discharge.    Brandon presents to our office for recent hospitalization follow-up visit.  He is feeling well denies dyspnea with minimal or moderate exertion chest pain palpitation lightheadedness or dizziness.  He admits to the day after he was discharged episode of dizziness which has not reoccurred.  Brandon informs me his father passed away right before he had his heart attack.    Medical History   Primary Cardiologist  Hypertension  Dyslipidemia 1/28/25 , TG 85, HDL 36, LDL 65  Interstitial lung disease    Remote hx of smoking cigarettes  Father with a hx of CAD     Patient Active Problem List   Diagnosis    NSTEMI (non-ST elevated myocardial infarction) ([facility])    Essential hypertension    Hyperlipidemia    GERD (gastroesophageal reflux disease)    Pre diabetes    Abnormal CT scan    Ischemic cardiomyopathy     No past medical history on file.  Social History     Socioeconomic History    Marital status: /Civil Union     Spouse name: Not on file    Number of children: Not on file    Years of education: Not on file    Highest education level: Not on file   Occupational History    Not on file   Tobacco Use    Smoking status: Never    Smokeless tobacco: Never   Substance and Sexual Activity    Alcohol use: Yes     Alcohol/week: 1.0 standard drink of alcohol     Types: 1 Standard drinks or equivalent per week    Drug use: Never    Sexual activity: Not  Currently   Other Topics Concern    Not on file   Social History Narrative    Not on file     Social Drivers of Health     Financial Resource Strain: Not on file   Food Insecurity: No Food Insecurity (2025)    Nursing - Inadequate Food Risk Classification     Worried About Running Out of Food in the Last Year: Not on file     Ran Out of Food in the Last Year: Not on file     Ran Out of Food in the Last Year: Never true   Transportation Needs: No Transportation Needs (2025)    Nursing - Transportation Risk Classification     Lack of Transportation: Not on file     Lack of Transportation: No   Physical Activity: Not on file   Stress: Not on file   Social Connections: Not on file   Intimate Partner Violence: Unknown (2025)    Nursing IPS     Feels Physically and Emotionally Safe: Not on file     Physically Hurt by Someone: Not on file     Humiliated or Emotionally Abused by Someone: Not on file     Physically Hurt by Someone: No     Hurt or Threatened by Someone: No   Housing Stability: Unknown (2025)    Nursing: Inadequate Housing Risk Classification     Has Housing: Not on file     Worried About Losing Housing: Not on file     Unable to Get Utilities: Not on file     Unable to Pay for Housing in the Last Year: No     Has Housin      No family history on file.  Past Surgical History:   Procedure Laterality Date    CARDIAC CATHETERIZATION N/A 2025    Procedure: Cardiac Coronary Angiogram;  Surgeon: Foster Araya DO;  Location: [facility];  Service: Cardiology    CARDIAC CATHETERIZATION  2025    Procedure: Cardiac  Catherization;  Surgeon: Foster Araya DO;  Location: [facility];  Service: Cardiology    CARDIAC CATHETERIZATION N/A 2025    Procedure: Cardiac PCI;  Surgeon: Foster Araya DO;  Location: [facility];  Service: Cardiology       Current Outpatient Medications:     aspirin 81 mg chewable tablet, Chew 81 mg daily, Disp: ,  Rfl:     atorvastatin (LIPITOR) 80 mg tablet, Take 1 tablet (80 mg total) by mouth daily with dinner, Disp: 30 tablet, Rfl: 0    clindamycin (CLEOCIN) 300 MG capsule, Take 2 capsules by mouth, Disp: , Rfl:     fenofibrate (TRICOR) 145 mg tablet, Take 1 tablet by mouth daily, Disp: , Rfl:     Folic Acid 0.8 MG CAPS, every 24 hours, Disp: , Rfl:     lisinopril (ZESTRIL) 10 mg tablet, Take 1 tablet (10 mg total) by mouth daily Do not start before February 1, 2025., Disp: 30 tablet, Rfl: 0    metoprolol succinate (TOPROL-XL) 50 mg 24 hr tablet, Take 1 tablet (50 mg total) by mouth daily, Disp: 30 tablet, Rfl: 0    multivitamin (THERAGRAN) TABS, Take 1 tablet by mouth daily, Disp: , Rfl:     nitroglycerin (NITROSTAT) 0.4 mg SL tablet, Place 1 tablet (0.4 mg total) under the tongue every 5 (five) minutes as needed for chest pain, Disp: 10 tablet, Rfl: 0    Omega-3 1000 MG CAPS, Take 2 g by mouth daily, Disp: , Rfl:     omeprazole (PriLOSEC OTC) 20 MG tablet, 20 mg every 24 hours, Disp: , Rfl:     Potassium 99 MG TABS, every 24 hours, Disp: , Rfl:     ticagrelor (BRILINTA) 90 MG, Take 1 tablet (90 mg total) by mouth every 12 (twelve) hours, Disp: 60 tablet, Rfl: 0  Allergies   Allergen Reactions    Penicillins Edema and Swelling    Sulfa Antibiotics Edema and Swelling       Labs:  Admission on 01/29/2025, Discharged on 01/31/2025   Component Date Value    PTT 01/29/2025 33     POC Glucose 01/29/2025 119     PTT 01/30/2025 66 (H)     POC Glucose 01/30/2025 112     Sodium 01/30/2025 137     Potassium 01/30/2025 4.2     Chloride 01/30/2025 103     CO2 01/30/2025 28     ANION GAP 01/30/2025 6     BUN 01/30/2025 21     Creatinine 01/30/2025 1.26     Glucose 01/30/2025 102     Calcium 01/30/2025 9.8     eGFR 01/30/2025 57     Activated Clotting Time,* 01/30/2025 299 (H)     Specimen Type 01/30/2025 ARTERIAL     POC Glucose 01/30/2025 99     POC Glucose 01/30/2025 105     Ventricular Rate 01/30/2025 71     Atrial Rate 01/30/2025  71     MA Interval 01/30/2025 166     QRSD Interval 01/30/2025 90     QT Interval 01/30/2025 390     QTC Interval 01/30/2025 423     P Axis 01/30/2025 26     QRS Axis 01/30/2025 -7     T Wave [place] 01/30/2025 -28     Sodium 01/31/2025 133 (L)     Potassium 01/31/2025 4.4     Chloride 01/31/2025 99     CO2 01/31/2025 27     ANION GAP 01/31/2025 7     BUN 01/31/2025 23     Creatinine 01/31/2025 1.40 (H)     Glucose 01/31/2025 108     Calcium 01/31/2025 9.3     eGFR 01/31/2025 50     WBC 01/31/2025 8.99     RBC 01/31/2025 4.90     Hemoglobin 01/31/2025 15.0     Hematocrit 01/31/2025 46.5     MCV 01/31/2025 95     MCH 01/31/2025 30.6     MCHC 01/31/2025 32.3     RDW 01/31/2025 12.8     Platelets 01/31/2025 332     MPV 01/31/2025 9.1     Magnesium 01/31/2025 2.1     POC Glucose 01/31/2025 93     POC Glucose 01/31/2025 84    Admission on 01/28/2025, Discharged on 01/29/2025   Component Date Value    Ventricular Rate 01/28/2025 76     Atrial Rate 01/28/2025 76     MA Interval 01/28/2025 188     QRSD Interval 01/28/2025 92     QT Interval 01/28/2025 372     QTC Interval 01/28/2025 418     P Axis 01/28/2025 67     QRS Axis 01/28/2025 46     T Wave [place] 01/28/2025 35     Ventricular Rate 01/28/2025 80     Atrial Rate 01/28/2025 80     MA Interval 01/28/2025 184     QRSD Interval 01/28/2025 92     QT Interval 01/28/2025 378     QTC Interval 01/28/2025 435     P Axis 01/28/2025 63     QRS [place] 01/28/2025 34     T Wave [place] 01/28/2025 12     Ventricular Rate 01/28/2025 85     Atrial Rate 01/28/2025 85     MA Interval 01/28/2025 182     QRSD Interval 01/28/2025 92     QT Interval 01/28/2025 378     QTC Interval 01/28/2025 449     P Axis 01/28/2025 64     QRS [place] 01/28/2025 43     T Wave [place] 01/28/2025 20     WBC 01/28/2025 9.27     RBC 01/28/2025 4.82     Hemoglobin 01/28/2025 14.7     Hematocrit 01/28/2025 45.1     MCV 01/28/2025 94     MCH 01/28/2025 30.5     MCHC 01/28/2025 32.6     RDW 01/28/2025 12.7     MPV 01/28/2025 9.0      Platelets 01/28/2025 342     nRBC 01/28/2025 0     Segmented % 01/28/2025 58     Immature Grans % 01/28/2025 1     Lymphocytes % 01/28/2025 26     Monocytes % 01/28/2025 12     Eosinophils Relative 01/28/2025 3     Basophils Relative 01/28/2025 0     Absolute Neutrophils 01/28/2025 5.43     Absolute Immature Grans 01/28/2025 0.05     Absolute Lymphocytes 01/28/2025 2.38     Absolute Monocytes 01/28/2025 1.14     Eosinophils Absolute 01/28/2025 0.24     Basophils Absolute 01/28/2025 0.03     Sodium 01/28/2025 138     Potassium 01/28/2025 4.1     Chloride 01/28/2025 103     CO2 01/28/2025 29     ANION GAP 01/28/2025 6     BUN 01/28/2025 20     Creatinine 01/28/2025 1.14     Glucose 01/28/2025 120     Calcium 01/28/2025 9.2     AST 01/28/2025 27     ALT 01/28/2025 20     Alkaline Phosphatase 01/28/2025 57     Total Protein 01/28/2025 7.0     Albumin 01/28/2025 4.1     Total Bilirubin 01/28/2025 0.54     eGFR 01/28/2025 64     Lipase 01/28/2025 14     hs TnI 0hr 01/28/2025 781 (H)     hs TnI 2hr 01/28/2025 1,320 (H)     Delta 2hr hsTnI 01/28/2025 539 (H)     PTT 01/28/2025 29     Protime 01/28/2025 13.7     INR 01/28/2025 1.00     hs TnI 4hr 01/28/2025 5,162 (H)     Delta 4hr hsTnI 01/28/2025 4,381 (H)     Ventricular Rate 01/28/2025 68     Atrial Rate 01/28/2025 68     NM Interval 01/28/2025 186     QRSD Interval 01/28/2025 88     QT Interval 01/28/2025 390     QTC Interval 01/28/2025 414     P Axis 01/28/2025 48     QRS Axis 01/28/2025 34     T Wave [place] 01/28/2025 10     PTT 01/28/2025 39 (H)     Cholesterol 01/28/2025 118     Triglycerides 01/28/2025 85     HDL, Direct 01/28/2025 36 (L)     LDL Calculated 01/28/2025 65     Hemoglobin A1C 01/28/2025 6.4 (H)     EAG 01/28/2025 137     Hemoglobin A1C 01/28/2025 6.2 (H)     EAG 01/28/2025 131     LA/Ao Ratio 2D 01/29/2025 1.11     LA Volume Index (BP) 01/29/2025 20.9     LV Diastolic Volume (BP) 01/29/2025 113     LV Systolic Volume (BP) 01/29/2025 61     MV Peak A  Rafael 01/29/2025 0.72     MV stenosis pressure 1/2* 01/29/2025 62     MV VTI 01/29/2025 19     MV Peak E Rafael 01/29/2025 57     MV peak gradient antegra* 01/29/2025 2     LVOT stroke volume 01/29/2025 69.35     Ao VTI 01/29/2025 35.9     LVOT peak VTI 01/29/2025 16.7     LVOT area 01/29/2025 4.15     LVOT diameter 01/29/2025 2.3     E wave deceleration time 01/29/2025 210     E/A ratio 01/29/2025 0.79     MV valve area p 1/2 meth* 01/29/2025 3.55     MV mean gradient antegra* 01/29/2025 1     AV mean gradient 01/29/2025 7     AV area by cont VTI 01/29/2025 1.9     LVOT mn grad 01/29/2025 1.0     RVID d 01/29/2025 4.2     A4C EF 01/29/2025 35     Aortic valve mean veloci* 01/29/2025 12.60     Left ventricular stroke * 01/29/2025 68.00     IVSd 01/29/2025 1.20     Tricuspid annular plane * 01/29/2025 1.70     Ao root 01/29/2025 3.50     LVPWd 01/29/2025 1.10     LA size 01/29/2025 3.9     LA volume (BP) 01/29/2025 46     EF 01/29/2025 46     FS 01/29/2025 26     LVIDS 01/29/2025 3.90     IVS 01/29/2025 1.2     LVIDd 01/29/2025 5.30     LEFT VENTRICLE SYSTOLIC * 01/29/2025 68     LV DIASTOLIC VOLUME (MOD* 01/29/2025 135     LVOT stroke volume index 01/29/2025 31.40     LVOT Cardiac Output 01/29/2025 4.80     Left Atrium Area-systoli* 01/29/2025 15.9     Left Atrium Area-systoli* 01/29/2025 18.2     MV E' Tissue Velocity La* 01/29/2025 9     MV E' Tissue Velocity Se* 01/29/2025 8     LVSV, 2D 01/29/2025 68     MV valve area by continu* 01/29/2025 3.65     DVI 01/29/2025 0.47     AV valve area 01/29/2025 1.93     BSA 01/29/2025 2.2     LV Diastolic Volume Inde* 01/29/2025 51.4     LV Systolic Volume Index* 01/29/2025 27.7     AV LVOT peak gradient 01/29/2025 2.1     LVOT peak rafael 01/29/2025 72.5     Aortic valve peak veloci* 01/29/2025 1.8     AV peak gradient 01/29/2025 12.3     LV EF 01/29/2025 45     POC Glucose 01/28/2025 117     PTT 01/28/2025 51 (H)     POC Glucose 01/28/2025 101     PTT 01/28/2025 56 (H)     POC  Glucose 01/28/2025 109     PTT 01/29/2025 119 (H)     WBC 01/29/2025 8.09     RBC 01/29/2025 4.78     Hemoglobin 01/29/2025 15.0     Hematocrit 01/29/2025 45.1     MCV 01/29/2025 94     MCH 01/29/2025 31.4     MCHC 01/29/2025 33.3     RDW 01/29/2025 13.1     Platelets 01/29/2025 322     MPV 01/29/2025 9.2     Sodium 01/29/2025 137     Potassium 01/29/2025 4.4     Chloride 01/29/2025 103     CO2 01/29/2025 28     ANION GAP 01/29/2025 6     BUN 01/29/2025 19     Creatinine 01/29/2025 1.16     Glucose 01/29/2025 105     Calcium 01/29/2025 9.3     eGFR 01/29/2025 63     PTT 01/29/2025 >210 (HH)     POC Glucose 01/29/2025 88     POC Glucose 01/29/2025 104     POC Glucose 01/29/2025 102      Imaging: Cardiac catheterization  Result Date: 1/30/2025  Narrative:   mid RCA PCI culprit patients presentation NSTEMI, successful percutaneous revascularization with MIKE placement   Mid RCA lesion is 90% stenosed.     Echo complete w/ contrast if indicated  Result Date: 1/29/2025  Narrative:   Left Ventricle: Left ventricular cavity size is normal. Wall thickness is mildly increased. The left ventricular ejection fraction is 45%. Systolic function is mildly reduced. Diastolic function is mildly abnormal, consistent with grade I (abnormal) relaxation.   The following segments are akinetic: basal inferior.   The following segments are hypokinetic: basal inferoseptal, mid inferior and mid inferolateral.   All other segments are normal.   Aortic Valve: The aortic valve is trileaflet. The leaflets are moderately thickened. The leaflets are moderately calcified. There is mildly reduced mobility. There is mild stenosis. The aortic valve mean gradient is 7 mmHg. The dimensionless velocity index is 0.47. The aortic valve area is 1.93 cm2.   Mitral Valve: There is trace regurgitation.     XR chest portable  Result Date: 1/28/2025  Narrative: XR CHEST PORTABLE INDICATION: Acute Resp Failure. COMPARISON: Chest radiograph November 17, 2015  FINDINGS: Bilateral lower lung predominant peripheral reticular opacities. No focal consolidation. No pneumothorax or pleural effusion. Normal cardiomediastinal silhouette. Bones are unremarkable for age. Normal upper abdomen.     Impression: Chronic interstitial lung disease without acute findings. Workstation performed: [id number]     CT chest without contrast  Result Date: 1/28/2025  Narrative: CT CHEST WITHOUT IV CONTRAST INDICATION: chest pain, abnormal CXR. COMPARISON: Multiple priors most recently same day chest radiograph TECHNIQUE: CT examination of the chest was performed without intravenous contrast. Multiplanar 2D reformatted images were created from the source data. This examination, like all CT scans performed in the [facility], was performed utilizing techniques to minimize radiation dose exposure, including the use of iterative reconstruction and automated exposure control. Radiation dose length product (DLP) for this visit: 644.91 mGy-cm FINDINGS: LUNGS: Mild subpleural fibrotic changes throughout the lungs with mild honeycombing. No focal consolidation or suspicious mass PLEURA: Unremarkable. HEART/GREAT VESSELS: Heart is unremarkable for patient's age. No thoracic aortic aneurysm. MEDIASTINUM AND RAGHU: Unremarkable. CHEST WALL AND LOWER NECK: Unremarkable. VISUALIZED STRUCTURES IN THE UPPER ABDOMEN: Unremarkable. OSSEOUS STRUCTURES: Spinal degenerative changes are noted. No acute fracture or destructive osseous lesion.     Impression: No acute abnormality identified. Findings consistent with fibrotic interstitial lung disease Workstation performed: [id number]       Review of Systems:  Review of Systems   All other systems reviewed and are negative.      Physical Exam:  Physical Exam  Vitals reviewed.   Constitutional:       Appearance: Normal appearance.   HENT:      Head: Normocephalic.   Cardiovascular:      Rate and Rhythm: Normal rate and regular rhythm.      Pulses: Normal  pulses.      Heart sounds: Normal heart sounds.   Pulmonary:      Effort: Pulmonary effort is normal.      Breath sounds: Normal breath sounds.   Musculoskeletal:         General: Normal range of motion.      Cervical back: Normal range of motion and neck supple.      Right lower leg: No edema.      Left lower leg: No edema.   Skin:     General: Skin is warm and dry.      Capillary Refill: Capillary refill takes less than 2 seconds.   Neurological:      General: No focal deficit present.      Mental Status: He is alert and oriented to person, place, and time.   Psychiatric:         Mood and Affect: Mood normal.         Behavior: Behavior normal.

## 2025-02-06 ENCOUNTER — OFFICE VISIT (OUTPATIENT)
Dept: CARDIOLOGY CLINIC | Facility: CLINIC | Age: 70
End: 2025-02-06
Payer: COMMERCIAL

## 2025-02-06 VITALS
HEART RATE: 77 BPM | WEIGHT: 225.1 LBS | SYSTOLIC BLOOD PRESSURE: 124 MMHG | HEIGHT: 73 IN | BODY MASS INDEX: 29.83 KG/M2 | DIASTOLIC BLOOD PRESSURE: 78 MMHG | OXYGEN SATURATION: 97 %

## 2025-02-06 DIAGNOSIS — N18.32 STAGE 3B CHRONIC KIDNEY DISEASE (HCC): ICD-10-CM

## 2025-02-06 DIAGNOSIS — I25.5 ISCHEMIC CARDIOMYOPATHY: ICD-10-CM

## 2025-02-06 DIAGNOSIS — Z95.5 S/P DRUG ELUTING CORONARY STENT PLACEMENT: Primary | ICD-10-CM

## 2025-02-06 DIAGNOSIS — E78.2 MIXED HYPERLIPIDEMIA: ICD-10-CM

## 2025-02-06 PROCEDURE — 99214 OFFICE O/P EST MOD 30 MIN: CPT | Performed by: NURSE PRACTITIONER

## 2025-02-06 NOTE — ASSESSMENT & PLAN NOTE
1/28/25 , TG 85, HDL 36, LDL 65  Continue Lipitor 40 mg daily, Tricor 145 mg daily heart healthy diet.  Follow-up fasting lipid profile in 3 months

## 2025-02-06 NOTE — ASSESSMENT & PLAN NOTE
Lab Results   Component Value Date    EGFR 50 01/31/2025    EGFR 57 01/30/2025    EGFR 63 01/29/2025    CREATININE 1.40 (H) 01/31/2025    CREATININE 1.26 01/30/2025    CREATININE 1.16 01/29/2025   Follow up with PCP,  JODI in one month

## 2025-02-06 NOTE — ASSESSMENT & PLAN NOTE
1/29/25 LVEF 45% LVIDd 5.3cm  Continue on metoprolol 50 mg daily, lisinopril 10 mg daily, follow-up limited TTE in 3 months  2gm sodium diet, instructed to monitor for SS of HF and when to call our office

## 2025-02-13 ENCOUNTER — CLINICAL SUPPORT (OUTPATIENT)
Dept: CARDIAC REHAB | Facility: HOSPITAL | Age: 70
End: 2025-02-13
Attending: INTERNAL MEDICINE
Payer: COMMERCIAL

## 2025-02-13 DIAGNOSIS — I21.4 NSTEMI (NON-ST ELEVATED MYOCARDIAL INFARCTION) (HCC): Primary | ICD-10-CM

## 2025-02-13 PROCEDURE — 93797 PHYS/QHP OP CAR RHAB WO ECG: CPT

## 2025-02-13 NOTE — PROGRESS NOTES
CARDIAC REHABILITATION   ASSESSMENT AND INDIVIDUALIZED TREATMENT PLAN  INITIAL           Today's date: 2025   # of Exercise Sessions Completed: 1-evaluation  Patient name: Brandon Thompson      : 1955  Age: 69 y.o.       MRN: [id number]  Referring Physician: Dr. Jefe Woods  Cardiologist: Dr. Jefe Woods  Provider: Luiz  Clinician: Mary Ann Smith MS, CEP          Treatment is tailored to this patient's individual needs.  The ITP was reviewed with the patient and all questions were answered to their satisfaction.  Additional ITP documentation can be found electronically including daily and monthly exercise summaries, daily session notes with ECG summaries, education notes, daily medication reconciliation, and daily physician supervision.      SUMMARY:  Mr. Thompson is here today for his cardiac rehabilitation evaluation after recent NSTEMI and stenting. He is feeling good overall. He does notice less energy than prior to MI. He reports he had been exercising regularly 3x/week at the [facility] prior to MI and did not have any cardiac symptoms with exercise. His goal is to return to his regular exercise routine in cardiac rehab and learn more about exercise program needed for heart health. He will plan to resume exercise at the [facility] after completing CR sessions.  He reports he has been making changes to his diet and is eating less meat and has cut out amount of deshpande he has been used to eating.   He is in agreement to attend CR sessions 3x/week for 36 sessions over the next 12 weeks.        Dx:   Encounter Diagnosis   Name Primary?    NSTEMI (non-ST elevated myocardial infarction) (HCC)        Description of Diagnosis: NSTEMI, status post angioplasty with [employer] frontier drug-eluting stent placed across the 90% stenosis, 0% residual stenosis.   Date of onset: 2025  Other Cardiac History: none        ASSESSMENT    Medical History:   No past medical history on file.    Family History:  No family  history on file.    Allergies:   Penicillins and Sulfa antibiotics    Current Medications:   Current Outpatient Medications   Medication Sig Dispense Refill    aspirin 81 mg chewable tablet Chew 81 mg daily      atorvastatin (LIPITOR) 80 mg tablet Take 1 tablet (80 mg total) by mouth daily with dinner (Patient taking differently: Take 40 mg by mouth daily with dinner) 30 tablet 0    clindamycin (CLEOCIN) 300 MG capsule Take 2 capsules by mouth (Patient not taking: Reported on 2/6/2025)      fenofibrate (TRICOR) 145 mg tablet Take 1 tablet by mouth daily      Folic Acid 0.8 MG CAPS every 24 hours      lisinopril (ZESTRIL) 10 mg tablet Take 1 tablet (10 mg total) by mouth daily Do not start before February 1, 2025. 30 tablet 0    metoprolol succinate (TOPROL-XL) 50 mg 24 hr tablet Take 1 tablet (50 mg total) by mouth daily 30 tablet 0    multivitamin (THERAGRAN) TABS Take 1 tablet by mouth daily      nitroglycerin (NITROSTAT) 0.4 mg SL tablet Place 1 tablet (0.4 mg total) under the tongue every 5 (five) minutes as needed for chest pain (Patient not taking: Reported on 2/6/2025) 10 tablet 0    Omega-3 1000 MG CAPS Take 2 g by mouth daily      Potassium 99 MG TABS every 24 hours      ticagrelor (BRILINTA) 90 MG Take 1 tablet (90 mg total) by mouth every 12 (twelve) hours 60 tablet 0     No current facility-administered medications for this visit.       Medication compliance: Yes   Comments: Pt reports to be compliant with medications    Physical Limitations: Hip burning with walking    Fall Risk: Low   Comments: Ambulates with a steady gait with no assist device    Cultural needs: none      CAD Risk Factors:  Cholesterol: Yes  HTN: Yes  DM: No  Obesity: No   Inactivity: No      EXERCISE ASSESSMENT:     Initial Fitness Assessment: Submaximal TM ETT:  Resting:  BP: 142/78  HR: 83, Exercise:  BP: 148/78  HR: 115, METs:  3.08, ECG Summary: NSR, and Test terminated at:  RHR +30    Current Functional Status:  Occupation:  "retired  Recreation/Physical Activity: regular exercise at [facility] 3x/week prior to MI-has not returned since MI  ADL’s:No limitations  [place]: No limitations  Home exercise: none  Other Comments: n/a      SMART Exercise Goals:   10% improvement in functional capacity based on max METs achieved in initial fitness assessment  improved DASI score by 10%  increased exercise capacity by 40% based on peak METs tolerated in cardiac rehab exercise session  maintain > 150 minutes per week of moderate intensity exercise    Patient Specific EXERCISE GOALS:       Return to regular exercise routine at the [facility] 3x/week  Learn more about exercise program needed for heart health, THR, guildelines  Increase energy levels post MI    Functional Capacity Screening Tool:  [facility] Activity Status Index:  8.54 METs    NUTRITION ASSESSMENT:    Initial Weight:  227.2 lb  Current Weight:     Height:   Ht Readings from Last 1 Encounters:   02/06/25 6' 1\" (1.854 m)       Rate Your Plate Score: 57/81    Diabetes: N/A  A1c: 6.2    last measured: 1/28/2025    Lipid management: Discussed diet and lipid management and Last lipid profile 1/28/2025  Chol 118  TRG 85  HDL 36  LDL 65    Current Dietary Habits:  Decreasing meat intake, eating less deshpande. Has lost 20 lbs over 2 months prior to MI    SMART Nutrition Goals:   LDL <100, HDL >40, TRG <150, and CHOL <200    Patient Specific NUTRITION GOALS:     1. Continue eating less meat and less deshpande weekly   2. Learn more about heart healthy eating and label reading  3. Continue losing weight       Drug/Alcohol Use:   No      PSYCHOSOCIAL ASSESSMENT:    Date of last Assessment:  2/13/2025  Depression screening:  PHQ-9 = 0    Interpretation:  0 =No Depression  Anxiety screening:  ANGEL-7 = 0    Interpretation: 0-4  = Not anxious    Pt self-report of depression and anxiety   Patient reports they are coping well with good social support and denies depression or anxiety  Reports sufficient emotional support "     Self-reported stress level:  3   Stressors:  father recently passed away and then had MI, is working on [employer] estate now. He had been used to caring for his father and spending a lot of time with him before he passed. He is now trying to establish a routine for himself.  Stress Management Tools: practice Relaxation Techniques, exercise, keep a positive mindset, enjoy a hobby, and spend time with family    SMART Psychosocial Goals:     Physical Fitness in [facility] Score < 3 and feel less tired with more energy    Patient Specific PSYCHOCOSOCIAL GOALS:    Find a routine for himself after spending so much time caring for his father  Continue volunteering as  at [facility]    Quality of Life Screen:  (Higher score indicates disease impact on QOL)  [facility] COOP score: 12/45     Social Support:   spouse and friends  Community/Social Activities:  at [facility]     Psychosocial Assessment as it relates to rehabilitation:   Patient denies issues with his/her family or home life that may affect their rehabilitation efforts.       OTHER CORE COMPONENT ASSESSMENT:    Tobacco Use:     N/A:  Patient is a non-smoker     Anginal Symptoms:   chest tightness, sweating   NTG use: No prescription    SMART Goals:   consistent, controlled resting BP < 130/80 and medication compliance    Patient Specific CORE COMPONENT GOALS:    Manage BP with diet, exercise, and medications  Return to regular exercise routine to help manage CAD        INDIVIDUALIZED TREATMENT PLAN      EXERCISE GOALS and PLAN      Progress toward Exercise goals:   Reviewed Pt goals and determined plan of care    Exercise Plan:    education on home exercise guidelines, home exercise 30+ mins 2 days opposite CR, and Group class: Risk Factors for Heart Disease    The patient was counseled on exercise guidelines to achieve a minimum of 150 mins/wk of moderate intensity (RPE 4-6)   exercise and encouraged to add  1-2 days of exercise on opposite days of cardiac rehab as tolerated.       PHYSICIAN PRESCRIBED EXERCISE:    Current Aerobic Exercise Prescription:      Frequency: 3 days/week   Supplement with home exercise 2+ days/wk as tolerated       Minutes: 20 - 40         METS: 2.5-3.5            HR: RHR +30-40bpm   RPE: 4-6         Modalities: Treadmill, UBE, Rower, NuStep, and Recumbent bike     Exercise workloads will be progressed gradually as tolerated, within limits of patient's ability, and according to the patient's   response to the exercise program.      Aerobic Exercise Prescription Plan for Progression   Frequency: 3 days/week of cardiac rehab       Supplement with home exercise 2+ days/wk as tolerated    Minutes: 40       >150 mins/wk of moderate intensity exercise   METS: 3.5   HR: RHR +30-40bpm     RPE: 4-6   Modalities: Treadmill, UBE, NuStep, and Recumbent bike    Strength trainin-3 days / week  12-15 repetitions  1-2 sets per modality   Will be added following 2-3 weeks of monitored exercise sessions   Modalities: Pull Downs, Lateral Raise, Arm Extension, Arm Curl, Front Raises, Shoulder Shrugs, and leg ext    Home Exercise: none, has membership at [employer] and may add 1 x/week along with CR    Exercise Education: benefit of exercise for CAD risk factors, home exercise guidelines, AHA guidelines to achieve >150 mins/wk of moderate exercise, RPE scale, and Group class: Risk Factors for Heart Disease     Readiness to change: Maintenance: (Maintaining the behavior change)      NUTRITION GOALS AND PLAN      Nutritional   Reviewed patient's Rate your Plate. Discussed key elements of heart healthy eating. Reviewed patient goals for dietary modifications and their clinical implications.  Reviewed most recent lipid profile.     Patient's progress toward Nutrition goals:    Reviewed Pt goals and determined plan of care      Nutrition Plan:   group class: Reading Food Labels, group Class: Heart Healthy Eating, eat  red meat once a week or less, and choose lean red meat    Measurable goals were based Rate Your Plate Dietary Self-Assessment. These are the areas in which the patient could score higher on the assessment.  Goals include recommendations for a heart healthy diet based on American Heart Association.    Nutrition Education:   heart healthy eating principles  weight loss and management strategies  low sodium diet  maintaining hydration  nutrition for  lipid management  nutrition for Improved BG control  target goal for A1c <7.0  portion control    Readiness to change: Action:  (Changing behavior)      PSYCHOSOCIAL GOALS AND PLAN    Psychosocial Assessment as it relates to rehabilitation:   Patient denies issues with his/her family or home life that may affect their rehabilitation efforts.     Patient's progress toward Psychosocial goals:    Reviewed Pt goals and determined plan of care    Psychosocial Intervention/plan:   Class: Stress and Your Health, Class: Relaxation, Exercise, and Keep a positive mindset    Psychosocial Education: signs/sxs of depression and benefits of a positive support system    Information to utilize [employer] was provided as well as contact information for counseling through  [facility] and group psychotherapy groups available.    Readiness to change: Action:  (Changing behavior)      OTHER CORE COMPONENTS GOALS and PLAN      Blood Pressure will be monitored throughout the program and cardiologist will be notified of elevated trends.    Pt will be encouraged to monitor home BP if advised by cardiologist.    Tobacco Plan:   N/A:  Pt is a non-smoker    Progress toward Core Component goals:   Reviewed Pt goals and determined plan of care    Other Core Components Intervention:   group class: Understanding Heart Disease, group class: Common Heart Medications, and medication compliance    Group and Individual Education:  understanding high blood pressure and it's relationship to CAD and  components of blood pressure management    Readiness to change: Action:  (Changing behavior)

## 2025-02-17 ENCOUNTER — CLINICAL SUPPORT (OUTPATIENT)
Dept: CARDIAC REHAB | Facility: HOSPITAL | Age: 70
End: 2025-02-17
Payer: COMMERCIAL

## 2025-02-17 DIAGNOSIS — I21.4 NSTEMI (NON-ST ELEVATED MYOCARDIAL INFARCTION) (HCC): Primary | ICD-10-CM

## 2025-02-17 PROCEDURE — 93798 PHYS/QHP OP CAR RHAB W/ECG: CPT

## 2025-02-19 ENCOUNTER — CLINICAL SUPPORT (OUTPATIENT)
Dept: CARDIAC REHAB | Facility: HOSPITAL | Age: 70
End: 2025-02-19
Payer: COMMERCIAL

## 2025-02-19 DIAGNOSIS — I21.4 NSTEMI (NON-ST ELEVATED MYOCARDIAL INFARCTION) (HCC): Primary | ICD-10-CM

## 2025-02-19 PROCEDURE — 93798 PHYS/QHP OP CAR RHAB W/ECG: CPT

## 2025-02-21 ENCOUNTER — CLINICAL SUPPORT (OUTPATIENT)
Dept: CARDIAC REHAB | Facility: HOSPITAL | Age: 70
End: 2025-02-21
Payer: COMMERCIAL

## 2025-02-21 DIAGNOSIS — I21.4 NSTEMI (NON-ST ELEVATED MYOCARDIAL INFARCTION) (HCC): Primary | ICD-10-CM

## 2025-02-21 PROCEDURE — 93798 PHYS/QHP OP CAR RHAB W/ECG: CPT

## 2025-02-24 ENCOUNTER — CLINICAL SUPPORT (OUTPATIENT)
Dept: CARDIAC REHAB | Facility: HOSPITAL | Age: 70
End: 2025-02-24
Payer: COMMERCIAL

## 2025-02-24 DIAGNOSIS — I21.4 NSTEMI (NON-ST ELEVATED MYOCARDIAL INFARCTION) (HCC): Primary | ICD-10-CM

## 2025-02-24 PROCEDURE — 93798 PHYS/QHP OP CAR RHAB W/ECG: CPT

## 2025-02-26 ENCOUNTER — CLINICAL SUPPORT (OUTPATIENT)
Dept: CARDIAC REHAB | Facility: HOSPITAL | Age: 70
End: 2025-02-26
Payer: COMMERCIAL

## 2025-02-26 DIAGNOSIS — I21.4 NSTEMI (NON-ST ELEVATED MYOCARDIAL INFARCTION) (HCC): Primary | ICD-10-CM

## 2025-02-26 PROCEDURE — 93798 PHYS/QHP OP CAR RHAB W/ECG: CPT

## 2025-02-28 ENCOUNTER — CLINICAL SUPPORT (OUTPATIENT)
Dept: CARDIAC REHAB | Facility: HOSPITAL | Age: 70
End: 2025-02-28
Payer: COMMERCIAL

## 2025-02-28 DIAGNOSIS — I21.4 NSTEMI (NON-ST ELEVATED MYOCARDIAL INFARCTION) (HCC): Primary | ICD-10-CM

## 2025-02-28 PROCEDURE — 93798 PHYS/QHP OP CAR RHAB W/ECG: CPT

## 2025-03-03 ENCOUNTER — CLINICAL SUPPORT (OUTPATIENT)
Dept: CARDIAC REHAB | Facility: HOSPITAL | Age: 70
End: 2025-03-03
Payer: COMMERCIAL

## 2025-03-03 DIAGNOSIS — I21.4 NSTEMI (NON-ST ELEVATED MYOCARDIAL INFARCTION) (HCC): Primary | ICD-10-CM

## 2025-03-03 PROCEDURE — 93798 PHYS/QHP OP CAR RHAB W/ECG: CPT

## 2025-03-05 ENCOUNTER — CLINICAL SUPPORT (OUTPATIENT)
Dept: CARDIAC REHAB | Facility: HOSPITAL | Age: 70
End: 2025-03-05
Payer: COMMERCIAL

## 2025-03-05 DIAGNOSIS — I21.4 NSTEMI (NON-ST ELEVATED MYOCARDIAL INFARCTION) (HCC): Primary | ICD-10-CM

## 2025-03-05 PROCEDURE — 93798 PHYS/QHP OP CAR RHAB W/ECG: CPT

## 2025-03-07 ENCOUNTER — CLINICAL SUPPORT (OUTPATIENT)
Dept: CARDIAC REHAB | Facility: HOSPITAL | Age: 70
End: 2025-03-07
Payer: COMMERCIAL

## 2025-03-07 DIAGNOSIS — I21.4 NSTEMI (NON-ST ELEVATED MYOCARDIAL INFARCTION) (HCC): Primary | ICD-10-CM

## 2025-03-07 PROCEDURE — 93797 PHYS/QHP OP CAR RHAB WO ECG: CPT

## 2025-03-07 NOTE — PROGRESS NOTES
Unable to access telemetry system today, HR monitored intermittently. Exercise session recorded via Epic Flow Sheets.

## 2025-03-10 ENCOUNTER — CLINICAL SUPPORT (OUTPATIENT)
Dept: CARDIAC REHAB | Facility: HOSPITAL | Age: 70
End: 2025-03-10
Payer: COMMERCIAL

## 2025-03-10 DIAGNOSIS — I21.4 NSTEMI (NON-ST ELEVATED MYOCARDIAL INFARCTION) (HCC): Primary | ICD-10-CM

## 2025-03-10 PROCEDURE — 93798 PHYS/QHP OP CAR RHAB W/ECG: CPT

## 2025-03-12 ENCOUNTER — CLINICAL SUPPORT (OUTPATIENT)
Dept: CARDIAC REHAB | Facility: HOSPITAL | Age: 70
End: 2025-03-12
Payer: COMMERCIAL

## 2025-03-12 DIAGNOSIS — I21.4 NSTEMI (NON-ST ELEVATED MYOCARDIAL INFARCTION) (HCC): Primary | ICD-10-CM

## 2025-03-12 PROCEDURE — 93798 PHYS/QHP OP CAR RHAB W/ECG: CPT

## 2025-03-12 NOTE — PROGRESS NOTES
CARDIAC REHABILITATION   ASSESSMENT AND INDIVIDUALIZED TREATMENT PLAN  30 DAY          Today's date: 3/12/2025   # of Exercise Sessions Completed: 12  Patient name: Brandon Thompson      : 1955  Age: 69 y.o.       MRN: [id number]  Referring Physician: Dr. Jefe Woods  Cardiologist: Dr. Jefe Woods  Provider: Luiz  Clinician: Mary Ann Smith MS, CEP      Treatment is tailored to this patient's individual needs.  The ITP was reviewed with the patient and all questions were answered to their satisfaction.  Additional ITP documentation can be found electronically including daily and monthly exercise summaries, daily session notes with ECG summaries, education notes, daily medication reconciliation, and daily physician supervision.      SUMMARY 2025    Resting BP  102/68 - 118/70,  HR 76 - 80  Exercise /72- 142/78.   - 130  Exercise session details:  40 minutes,  2.5-3.6 METs  Telemetry:  NSR  Symptoms: none  Home exercise/ADLs: going to the [facility] MWF after CR sessions to do weight training program  Patient's subjective report of progress: He is feeling good overall. He is noticing more energy than prior to MI. He reports he had been exercising regularly 3x/week at the [facility] prior to MI and did not have any cardiac symptoms with exercise. His goal is to return to his regular exercise routine in cardiac rehab and learn more about exercise program needed for heart health. He will plan to resume exercise at the [facility] after completing CR sessions.  He reports he has been making changes to his diet and is eating less meat and has cut out amount of deshpande he has been used to eating.   Clinical Comments: no concerns at this time. Is in agreement to continue attending CR sessions 3x/week over next 30 days      Dx:   Encounter Diagnosis   Name Primary?    NSTEMI (non-ST elevated myocardial infarction) (HCC) Yes       Description of Diagnosis: NSTEMI, status post angioplasty with Willis  frontier drug-eluting stent placed across the 90% stenosis, 0% residual stenosis.   Date of onset: 1/28/2025  Other Cardiac History: none        ASSESSMENT    Medical History:   No past medical history on file.    Family History:  No family history on file.    Allergies:   Penicillins and Sulfa antibiotics    Current Medications:   Current Outpatient Medications   Medication Sig Dispense Refill    aspirin 81 mg chewable tablet Chew 81 mg daily      atorvastatin (LIPITOR) 80 mg tablet Take 1 tablet (80 mg total) by mouth daily with dinner (Patient taking differently: Take 40 mg by mouth daily with dinner) 30 tablet 0    clindamycin (CLEOCIN) 300 MG capsule Take 2 capsules by mouth (Patient not taking: Reported on 2/6/2025)      fenofibrate (TRICOR) 145 mg tablet Take 1 tablet by mouth daily      Folic Acid 0.8 MG CAPS every 24 hours      lisinopril (ZESTRIL) 10 mg tablet Take 1 tablet (10 mg total) by mouth daily Do not start before February 1, 2025. 30 tablet 0    metoprolol succinate (TOPROL-XL) 50 mg 24 hr tablet Take 1 tablet (50 mg total) by mouth daily 30 tablet 0    multivitamin (THERAGRAN) TABS Take 1 tablet by mouth daily      nitroglycerin (NITROSTAT) 0.4 mg SL tablet Place 1 tablet (0.4 mg total) under the tongue every 5 (five) minutes as needed for chest pain (Patient not taking: Reported on 2/6/2025) 10 tablet 0    Omega-3 1000 MG CAPS Take 2 g by mouth daily      Potassium 99 MG TABS every 24 hours      ticagrelor (BRILINTA) 90 MG Take 1 tablet (90 mg total) by mouth every 12 (twelve) hours 60 tablet 0     No current facility-administered medications for this visit.       Medication compliance: Yes   Comments: Pt reports to be compliant with medications    Physical Limitations: Hip burning with walking    Fall Risk: Low   Comments: Ambulates with a steady gait with no assist device    Cultural needs: none      CAD Risk Factors:  Cholesterol: Yes  HTN: Yes  DM: No  Obesity: No   Inactivity:  "No      EXERCISE ASSESSMENT:     Initial Fitness Assessment: Submaximal TM ETT:  Resting:  BP: 142/78  HR: 83, Exercise:  BP: 148/78  HR: 115, METs:  3.08, ECG Summary: NSR, and Test terminated at:  RHR +30    Current Functional Status:  Occupation: retired  Recreation/Physical Activity: regular exercise at [facility] 3x/week prior to MI-has not returned since MI  ADL’s:No limitations  [place]: No limitations  Home exercise: none  Other Comments: n/a      SMART Exercise Goals:   10% improvement in functional capacity based on max METs achieved in initial fitness assessment  improved DASI score by 10%  increased exercise capacity by 40% based on peak METs tolerated in cardiac rehab exercise session  maintain > 150 minutes per week of moderate intensity exercise    Patient Specific EXERCISE GOALS:       Return to regular exercise routine at the [facility] 3x/week  Learn more about exercise program needed for heart health, THR, guildelines  Increase energy levels post MI    Functional Capacity Screening Tool:  [facility] Activity Status Index:  8.54 METs    NUTRITION ASSESSMENT:    Initial Weight:  227.2 lb  Current Weight: 220.4 lb    Height:   Ht Readings from Last 1 Encounters:   02/06/25 6' 1\" (1.854 m)       Rate Your Plate Score: 57/81    Diabetes: N/A  A1c: 6.2    last measured: 1/28/2025    Lipid management: Discussed diet and lipid management and Last lipid profile 1/28/2025  Chol 118  TRG 85  HDL 36  LDL 65    Current Dietary Habits:  Decreasing meat intake, eating less deshpande. Has lost 20 lbs over 2 months prior to MI    SMART Nutrition Goals:   LDL <100, HDL >40, TRG <150, and CHOL <200    Patient Specific NUTRITION GOALS:     1. Continue eating less meat and less deshpande weekly   2. Learn more about heart healthy eating and label reading   3. Continue losing weight       Drug/Alcohol Use:   No      PSYCHOSOCIAL ASSESSMENT:    Date of last Assessment:  2/13/2025  Depression screening:  PHQ-9 = 0    Interpretation:  0 =No " Depression  Anxiety screening:  ANGEL-7 = 0    Interpretation: 0-4  = Not anxious    Pt self-report of depression and anxiety   Patient reports they are coping well with good social support and denies depression or anxiety  Reports sufficient emotional support     Self-reported stress level:  3   Stressors:  father recently passed away and then had MI, is working on [employer] estate now. He had been used to caring for his father and spending a lot of time with him before he passed. He is now trying to establish a routine for himself.  Stress Management Tools: practice Relaxation Techniques, exercise, keep a positive mindset, enjoy a hobby, and spend time with family    SMART Psychosocial Goals:     Physical Fitness in [facility] Score < 3 and feel less tired with more energy    Patient Specific PSYCHOCOSOCIAL GOALS:    Find a routine for himself after spending so much time caring for his father  Continue volunteering as  at [facility].    Quality of Life Screen:  (Higher score indicates disease impact on QOL)  [facility] COOP score: 12/45     Social Support:   spouse and friends  Community/Social Activities:  at [facility]     Psychosocial Assessment as it relates to rehabilitation:   Patient denies issues with his/her family or home life that may affect their rehabilitation efforts.       OTHER CORE COMPONENT ASSESSMENT:    Tobacco Use:     N/A:  Patient is a non-smoker     Anginal Symptoms:   chest tightness, sweating   NTG use: No prescription    SMART Goals:   consistent, controlled resting BP < 130/80 and medication compliance    Patient Specific CORE COMPONENT GOALS:    Manage BP with diet, exercise, and medications  Return to regular exercise routine to help manage CAD        INDIVIDUALIZED TREATMENT PLAN      EXERCISE GOALS and PLAN      Progress toward Exercise goals:   Pt is progressing and showing improvement  toward the following goals:  increasing strength  and endurance with regular exercise, showing increased functional capacity with increasing MET levels-reaching 3.6 METs, re-establishing a regular exercise program in CR and still has plan to return to [facility].  , Will continue to educate and progress as tolerated.    Exercise Plan:    Continue attending CR sessions 3x/week over next 30 days. Start going to [facility] 1-2x/week on non-rehab days. education on home exercise guidelines, home exercise 30+ mins 2 days opposite CR, and Group class: Risk Factors for Heart Disease    The patient was counseled on exercise guidelines to achieve a minimum of 150 mins/wk of moderate intensity (RPE 4-6)   exercise and encouraged to add 1-2 days of exercise on opposite days of cardiac rehab as tolerated.       PHYSICIAN PRESCRIBED EXERCISE:    Current Aerobic Exercise Prescription:      Frequency: 3 days/week   Supplement with home exercise 2+ days/wk as tolerated       Minutes: 40         METS: 2.5-3.6           HR: RHR +30-40bpm   RPE: 4-6         Modalities: Treadmill, UBE, Rower, NuStep, and Recumbent bike     Exercise workloads will be progressed gradually as tolerated, within limits of patient's ability, and according to the patient's   response to the exercise program.      Aerobic Exercise Prescription Plan for Progression   Frequency: 3 days/week of cardiac rehab       Supplement with home exercise 2+ days/wk as tolerated    Minutes: 40 -50      >150 mins/wk of moderate intensity exercise   METS: 3.0-4.0   HR: RHR +30-40bpm     RPE: 4-6   Modalities: Treadmill, UBE, NuStep, and Recumbent bike    Strength trainin-3 days / week  12-15 repetitions  1-2 sets per modality   Will be added following 2-3 weeks of monitored exercise sessions-Avelino is completing weight training program at the [facility] MWF after he leaves his CR sessions to maintain his scheduled routine at the [facility]. Weights will not be part of his CR sessions.   Modalities: Pull Downs, Lateral Raise, Arm Extension, Arm  Curl, Front Raises, Shoulder Shrugs, and leg ext    Home Exercise: none, has membership at [employer] and may add 1-2 x/week along with CR    Exercise Education: benefit of exercise for CAD risk factors, home exercise guidelines, AHA guidelines to achieve >150 mins/wk of moderate exercise, RPE scale, and Group class: Risk Factors for Heart Disease     Readiness to change: Maintenance: (Maintaining the behavior change)      NUTRITION GOALS AND PLAN      Nutritional   Reviewed patient's Rate your Plate. Discussed key elements of heart healthy eating. Reviewed patient goals for dietary modifications and their clinical implications.  Reviewed most recent lipid profile.     Patient's progress toward Nutrition goals:    Pt is progressing and showing improvement  toward the following goals:  has been making healthier eating choices and decreasing portion sizes, has decreased amount of deshpande he is eating, doing well with weight loss goal and has lost 7 lbs since starting CR.  , Will continue to educate and progress as tolerated.      Nutrition Plan:   group class: Reading Food Labels, group Class: Heart Healthy Eating, eat red meat once a week or less, and choose lean red meat    Measurable goals were based Rate Your Plate Dietary Self-Assessment. These are the areas in which the patient could score higher on the assessment.  Goals include recommendations for a heart healthy diet based on American Heart Association.    Nutrition Education:   heart healthy eating principles  weight loss and management strategies  low sodium diet  maintaining hydration  nutrition for  lipid management  nutrition for Improved BG control  target goal for A1c <7.0  portion control    Readiness to change: Action:  (Changing behavior)      PSYCHOSOCIAL GOALS AND PLAN    Psychosocial Assessment as it relates to rehabilitation:   Patient denies issues with his/her family or home life that may affect their rehabilitation efforts.     Patient's progress  toward Psychosocial goals:    Pt is progressing and showing improvement  toward the following goals:  is maintaining regular exercise schedule MWF between CR sessions and weight training at the [facility]. [facility] and is moving forward with his schedule for the year. No concerns at this time with depression or anxiety.  , Will continue to educate and progress as tolerated.    Psychosocial Intervention/plan:   Class: Stress and Your Health, Class: Relaxation, Exercise, and Keep a positive mindset    Psychosocial Education: signs/sxs of depression and benefits of a positive support system    Information to utilize [employer] was provided as well as contact information for counseling through  Behavioral Health and group psychotherapy groups available.    Readiness to change: Action:  (Changing behavior)      OTHER CORE COMPONENTS GOALS and PLAN      Blood Pressure will be monitored throughout the program and cardiologist will be notified of elevated trends.    Pt will be encouraged to monitor home BP if advised by cardiologist.    Tobacco Plan:   N/A:  Pt is a non-smoker    Progress toward Core Component goals:   Pt is progressing and showing improvement  toward the following goals:  BP has been WNL, showing normal hemodynamic response to exercise, taking medications as prescribed and decreasing salt intake to help manage BP.  , Will continue to educate and progress as tolerated.    Other Core Components Intervention:   group class: Understanding Heart Disease, group class: Common Heart Medications, and medication compliance    Group and Individual Education:  understanding high blood pressure and it's relationship to CAD and components of blood pressure management    Readiness to change: Action:  (Changing behavior)

## 2025-03-14 ENCOUNTER — CLINICAL SUPPORT (OUTPATIENT)
Dept: CARDIAC REHAB | Facility: HOSPITAL | Age: 70
End: 2025-03-14
Payer: COMMERCIAL

## 2025-03-14 DIAGNOSIS — I21.4 NSTEMI (NON-ST ELEVATED MYOCARDIAL INFARCTION) (HCC): Primary | ICD-10-CM

## 2025-03-14 PROCEDURE — 93798 PHYS/QHP OP CAR RHAB W/ECG: CPT

## 2025-03-17 ENCOUNTER — CLINICAL SUPPORT (OUTPATIENT)
Dept: CARDIAC REHAB | Facility: HOSPITAL | Age: 70
End: 2025-03-17
Payer: COMMERCIAL

## 2025-03-17 DIAGNOSIS — I21.4 NSTEMI (NON-ST ELEVATED MYOCARDIAL INFARCTION) (HCC): Primary | ICD-10-CM

## 2025-03-17 PROCEDURE — 93798 PHYS/QHP OP CAR RHAB W/ECG: CPT

## 2025-03-19 ENCOUNTER — CLINICAL SUPPORT (OUTPATIENT)
Dept: CARDIAC REHAB | Facility: HOSPITAL | Age: 70
End: 2025-03-19
Payer: COMMERCIAL

## 2025-03-19 DIAGNOSIS — I21.4 NSTEMI (NON-ST ELEVATED MYOCARDIAL INFARCTION) (HCC): Primary | ICD-10-CM

## 2025-03-19 PROCEDURE — 93798 PHYS/QHP OP CAR RHAB W/ECG: CPT

## 2025-03-21 ENCOUNTER — CLINICAL SUPPORT (OUTPATIENT)
Dept: CARDIAC REHAB | Facility: HOSPITAL | Age: 70
End: 2025-03-21
Payer: COMMERCIAL

## 2025-03-21 DIAGNOSIS — I21.4 NSTEMI (NON-ST ELEVATED MYOCARDIAL INFARCTION) (HCC): Primary | ICD-10-CM

## 2025-03-21 PROCEDURE — 93798 PHYS/QHP OP CAR RHAB W/ECG: CPT

## 2025-03-22 NOTE — ASSESSMENT & PLAN NOTE
Patient presents with burning chest discomfort which began intermittently in December, which he associated with a viral illness.  Pain occurred in bilateral chest without associated symptoms, occurring with rest and activity, typically for less than 5 minutes, resolving without intervention.  Last night the chest burning lasted longer than 5 minutes prompting him to seek evaluation in the ER.     EKG on arrival with non specific ST changes, Troponin's elevated [id number]  - he was given 324 of aspirin and a heparin drip was initiated     CONNIE: EF 45%, grade I DD, akinesis in the basal inferior, hypsokinesis in basal inferoseptal, mid inferior and mid inferolateral, Mild AS with valve area of 1.93 cm 2, trace MR    Patient currently on: aspirin 81 mg , metoprolol tartrate 25 mg twice daily , atorvastatin, heparin drip    55

## 2025-03-24 ENCOUNTER — CLINICAL SUPPORT (OUTPATIENT)
Dept: CARDIAC REHAB | Facility: HOSPITAL | Age: 70
End: 2025-03-24
Payer: COMMERCIAL

## 2025-03-24 DIAGNOSIS — I21.4 NSTEMI (NON-ST ELEVATED MYOCARDIAL INFARCTION) (HCC): Primary | ICD-10-CM

## 2025-03-24 PROCEDURE — 93798 PHYS/QHP OP CAR RHAB W/ECG: CPT

## 2025-03-26 ENCOUNTER — CLINICAL SUPPORT (OUTPATIENT)
Dept: CARDIAC REHAB | Facility: HOSPITAL | Age: 70
End: 2025-03-26
Payer: COMMERCIAL

## 2025-03-26 DIAGNOSIS — I21.4 NSTEMI (NON-ST ELEVATED MYOCARDIAL INFARCTION) (HCC): Primary | ICD-10-CM

## 2025-03-26 PROCEDURE — 93798 PHYS/QHP OP CAR RHAB W/ECG: CPT

## 2025-03-28 ENCOUNTER — CLINICAL SUPPORT (OUTPATIENT)
Dept: CARDIAC REHAB | Facility: HOSPITAL | Age: 70
End: 2025-03-28
Payer: COMMERCIAL

## 2025-03-28 DIAGNOSIS — I21.4 NSTEMI (NON-ST ELEVATED MYOCARDIAL INFARCTION) (HCC): Primary | ICD-10-CM

## 2025-03-28 PROCEDURE — 93798 PHYS/QHP OP CAR RHAB W/ECG: CPT

## 2025-03-31 ENCOUNTER — CLINICAL SUPPORT (OUTPATIENT)
Dept: CARDIAC REHAB | Facility: HOSPITAL | Age: 70
End: 2025-03-31
Payer: COMMERCIAL

## 2025-03-31 DIAGNOSIS — I21.4 NSTEMI (NON-ST ELEVATED MYOCARDIAL INFARCTION) (HCC): Primary | ICD-10-CM

## 2025-03-31 PROCEDURE — 93798 PHYS/QHP OP CAR RHAB W/ECG: CPT

## 2025-04-02 ENCOUNTER — CLINICAL SUPPORT (OUTPATIENT)
Dept: CARDIAC REHAB | Facility: HOSPITAL | Age: 70
End: 2025-04-02
Payer: COMMERCIAL

## 2025-04-02 DIAGNOSIS — I21.4 NSTEMI (NON-ST ELEVATED MYOCARDIAL INFARCTION) (HCC): Primary | ICD-10-CM

## 2025-04-02 PROCEDURE — 93798 PHYS/QHP OP CAR RHAB W/ECG: CPT

## 2025-04-04 ENCOUNTER — CLINICAL SUPPORT (OUTPATIENT)
Dept: CARDIAC REHAB | Facility: HOSPITAL | Age: 70
End: 2025-04-04
Payer: COMMERCIAL

## 2025-04-04 DIAGNOSIS — I21.4 NSTEMI (NON-ST ELEVATED MYOCARDIAL INFARCTION) (HCC): Primary | ICD-10-CM

## 2025-04-04 PROCEDURE — 93798 PHYS/QHP OP CAR RHAB W/ECG: CPT

## 2025-04-07 ENCOUNTER — CLINICAL SUPPORT (OUTPATIENT)
Dept: CARDIAC REHAB | Facility: HOSPITAL | Age: 70
End: 2025-04-07
Payer: COMMERCIAL

## 2025-04-07 DIAGNOSIS — I21.4 NSTEMI (NON-ST ELEVATED MYOCARDIAL INFARCTION) (HCC): Primary | ICD-10-CM

## 2025-04-07 PROCEDURE — 93798 PHYS/QHP OP CAR RHAB W/ECG: CPT

## 2025-04-07 NOTE — PROGRESS NOTES
CARDIAC REHABILITATION   ASSESSMENT AND INDIVIDUALIZED TREATMENT PLAN  60 DAY          Today's date: 2025   # of Exercise Sessions Completed: 23  Patient name: Brandon Thompson      : 1955  Age: 70 y.o.       MRN: [id number]  Referring Physician: Dr. Jefe Woods  Cardiologist: Dr. Jefe Woods  Provider: Luiz  Clinician: Mary Ann Smith MS, CEP      Treatment is tailored to this patient's individual needs.  The ITP was reviewed with the patient and all questions were answered to their satisfaction.  Additional ITP documentation can be found electronically including daily and monthly exercise summaries, daily session notes with ECG summaries, education notes, daily medication reconciliation, and daily physician supervision.      SUMMARY 2025    Resting BP  108/62 - 122/76,  HR 59-79  Exercise /72- 144/80.  -141  Exercise session details:  45-55 minutes,  4.5-5.8 METs  Telemetry:  NSR  Symptoms: none  Home exercise/ADLs: going to the [facility] MWF after CR sessions to do weight training program, all ADLs no limitations  Patient's subjective report of progress: Avelino is feeling good overall. He is noticing more energy than prior to MI. He reports he had been exercising regularly 3x/week at the [facility] prior to MI and did not have any cardiac symptoms with exercise. His goal is to return to his regular exercise routine in cardiac rehab and learn more about exercise program needed for heart health. He will plan to resume exercise at the [facility] after completing CR sessions. He is on target with his goals at this time.   He reports he has been making changes to his diet and is eating less meat and has cut out amount of deshpande he has been used to eating.   Clinical Comments: no concerns at this time. Is in agreement to continue attending CR sessions 3x/week over next 30 days      Dx:   Encounter Diagnosis   Name Primary?    NSTEMI (non-ST elevated myocardial infarction) (HCC) Yes        Description of Diagnosis: NSTEMI, status post angioplasty with Willis frontier drug-eluting stent placed across the 90% stenosis, 0% residual stenosis.   Date of onset: 1/28/2025  Other Cardiac History: none        ASSESSMENT    Medical History:   No past medical history on file.    Family History:  No family history on file.    Allergies:   Penicillins and Sulfa antibiotics    Current Medications:   Current Outpatient Medications   Medication Sig Dispense Refill    aspirin 81 mg chewable tablet Chew 81 mg daily      atorvastatin (LIPITOR) 80 mg tablet Take 1 tablet (80 mg total) by mouth daily with dinner (Patient taking differently: Take 40 mg by mouth daily with dinner) 30 tablet 0    clindamycin (CLEOCIN) 300 MG capsule Take 2 capsules by mouth (Patient not taking: Reported on 2/6/2025)      fenofibrate (TRICOR) 145 mg tablet Take 1 tablet by mouth daily      Folic Acid 0.8 MG CAPS every 24 hours      lisinopril (ZESTRIL) 10 mg tablet Take 1 tablet (10 mg total) by mouth daily Do not start before February 1, 2025. 30 tablet 0    metoprolol succinate (TOPROL-XL) 50 mg 24 hr tablet Take 1 tablet (50 mg total) by mouth daily 30 tablet 0    multivitamin (THERAGRAN) TABS Take 1 tablet by mouth daily      nitroglycerin (NITROSTAT) 0.4 mg SL tablet Place 1 tablet (0.4 mg total) under the tongue every 5 (five) minutes as needed for chest pain (Patient not taking: Reported on 2/6/2025) 10 tablet 0    Omega-3 1000 MG CAPS Take 2 g by mouth daily      Potassium 99 MG TABS every 24 hours      ticagrelor (BRILINTA) 90 MG Take 1 tablet (90 mg total) by mouth every 12 (twelve) hours 60 tablet 0     No current facility-administered medications for this visit.       Medication compliance: Yes   Comments: Pt reports to be compliant with medications    Physical Limitations: Hip burning with walking    Fall Risk: Low   Comments: Ambulates with a steady gait with no assist device    Cultural needs: none      CAD Risk  "Factors:  Cholesterol: Yes  HTN: Yes  DM: No  Obesity: No   Inactivity: No      EXERCISE ASSESSMENT:     Initial Fitness Assessment: Submaximal TM ETT:  Resting:  BP: 142/78  HR: 83, Exercise:  BP: 148/78  HR: 115, METs:  3.08, ECG Summary: NSR, and Test terminated at:  RHR +30    Current Functional Status:  Occupation: retired  Recreation/Physical Activity: regular exercise at [facility] 3x/week prior to MI-has not returned since MI  ADL’s:No limitations  [place]: No limitations  Home exercise: none  Other Comments: n/a      SMART Exercise Goals:   10% improvement in functional capacity based on max METs achieved in initial fitness assessment  improved DASI score by 10%  increased exercise capacity by 40% based on peak METs tolerated in cardiac rehab exercise session  maintain > 150 minutes per week of moderate intensity exercise    Patient Specific EXERCISE GOALS:       Return to regular exercise routine at the [facility] 3x/week  Learn more about exercise program needed for heart health, THR, guildelines  Increase energy levels post MI    Functional Capacity Screening Tool:  [facility] Activity Status Index:  8.54 METs    NUTRITION ASSESSMENT:    Initial Weight:  227.2 lb  Current Weight: 209.8 lb    Height:   Ht Readings from Last 1 Encounters:   02/06/25 6' 1\" (1.854 m)       Rate Your Plate Score: 57/81    Diabetes: N/A  A1c: 6.2    last measured: 1/28/2025    Lipid management: Discussed diet and lipid management and Last lipid profile 1/28/2025  Chol 118  TRG 85  HDL 36  LDL 65    Current Dietary Habits:  Decreasing meat intake, eating less deshpande. Has lost 20 lbs over 2 months prior to MI    SMART Nutrition Goals:   LDL <100, HDL >40, TRG <150, and CHOL <200    Patient Specific NUTRITION GOALS:     1. Continue eating less meat and less deshpande weekly   2. Learn more about heart healthy eating and label reading   3. Continue losing weight       Drug/Alcohol Use:   No      PSYCHOSOCIAL ASSESSMENT:    Date of last Assessment:  " 2/13/2025  Depression screening:  PHQ-9 = 0    Interpretation:  0 =No Depression  Anxiety screening:  ANGEL-7 = 0    Interpretation: 0-4  = Not anxious    Pt self-report of depression and anxiety   Patient reports they are coping well with good social support and denies depression or anxiety  Reports sufficient emotional support     Self-reported stress level:  3   Stressors:  father recently passed away and then had MI, is working on [employer] now. He had been used to caring for his father and spending a lot of time with him before he passed. He is now trying to establish a routine for himself.  Stress Management Tools: practice Relaxation Techniques, exercise, keep a positive mindset, enjoy a hobby, and spend time with family    SMART Psychosocial Goals:     Physical Fitness in [facility] Score < 3 and feel less tired with more energy    Patient Specific PSYCHOCOSOCIAL GOALS:    Find a routine for himself after spending so much time caring for his father  Continue volunteering as  at [employer].    Quality of Life Screen:  (Higher score indicates disease impact on QOL)  [facility] COOP score: 12/45     Social Support:   spouse and friends  Community/Social Activities:  at [facility]     Psychosocial Assessment as it relates to rehabilitation:   Patient denies issues with his/her family or home life that may affect their rehabilitation efforts.       OTHER CORE COMPONENT ASSESSMENT:    Tobacco Use:     N/A:  Patient is a non-smoker     Anginal Symptoms:   chest tightness, sweating   NTG use: No prescription    SMART Goals:   consistent, controlled resting BP < 130/80 and medication compliance    Patient Specific CORE COMPONENT GOALS:    Manage BP with diet, exercise, and medications  Return to regular exercise routine to help manage CAD        INDIVIDUALIZED TREATMENT PLAN      EXERCISE GOALS and PLAN      Progress toward Exercise goals:   Pt is progressing and  showing improvement  toward the following goals:  increasing strength and endurance with regular exercise, showing increased functional capacity with increasing MET levels-reaching 4.3-5.8 METs, re-establishing a regular exercise program in CR and still has plan to return to [facility], has returned to [facility] 3x/week to complete strength training routine after his CR sessions MWF.  , Will continue to educate and progress as tolerated.    Exercise Plan:    Continue attending CR sessions 3x/week over next 30 days. Continue supplementing [facility] 3x/week to complete strength training.  education on home exercise guidelines, home exercise 30+ mins 2 days opposite CR, and Group class: Risk Factors for Heart Disease    The patient was counseled on exercise guidelines to achieve a minimum of 150 mins/wk of moderate intensity (RPE 4-6)   exercise and encouraged to add 1-2 days of exercise on opposite days of cardiac rehab as tolerated.       PHYSICIAN PRESCRIBED EXERCISE:    Current Aerobic Exercise Prescription:      Frequency: 3 days/week   Supplement with home exercise 2+ days/wk as tolerated       Minutes: 45-55         METS: 4.3-5.8          HR: RHR +30-40bpm   RPE: 4-6         Modalities: Treadmill, UBE, Rower, NuStep, and Recumbent bike     Exercise workloads will be progressed gradually as tolerated, within limits of patient's ability, and according to the patient's   response to the exercise program.      Aerobic Exercise Prescription Plan for Progression   Frequency: 3 days/week of cardiac rehab       Supplement with home exercise 2+ days/wk as tolerated    Minutes: 50-55     >150 mins/wk of moderate intensity exercise   METS: 4.0-6.0   HR: RHR +30-40bpm     RPE: 4-6   Modalities: Treadmill, UBE, NuStep, and Recumbent bike    Strength trainin-3 days / week  12-15 repetitions  1-2 sets per modality   Will be added following 2-3 weeks of monitored exercise sessions-Avelino is completing weight training program at the [facility]  after he leaves his CR sessions to maintain his scheduled routine at the [facility]. Weights will not be part of his CR sessions.   Modalities: Pull Downs, Lateral Raise, Arm Extension, Arm Curl, Front Raises, Shoulder Shrugs, and leg ext    Home Exercise: none, has membership at [facility] and may add 1-2 x/week along with CR    Exercise Education: benefit of exercise for CAD risk factors, home exercise guidelines, AHA guidelines to achieve >150 mins/wk of moderate exercise, RPE scale, and Group class: Risk Factors for Heart Disease     Readiness to change: Maintenance: (Maintaining the behavior change)      NUTRITION GOALS AND PLAN      Nutritional   Reviewed patient's Rate your Plate. Discussed key elements of heart healthy eating. Reviewed patient goals for dietary modifications and their clinical implications.  Reviewed most recent lipid profile.     Patient's progress toward Nutrition goals:    Pt is progressing and showing improvement  toward the following goals:  has been making healthier eating choices and decreasing portion sizes, has decreased amount of deshpande he is eating, doing well with weight loss goal and has lost 17.4 lbs since starting CR (209.8 lb current).  , Will continue to educate and progress as tolerated.      Nutrition Plan:   group class: Reading Food Labels, group Class: Heart Healthy Eating, eat red meat once a week or less, and choose lean red meat    Measurable goals were based Rate Your Plate Dietary Self-Assessment. These are the areas in which the patient could score higher on the assessment.  Goals include recommendations for a heart healthy diet based on American Heart Association.    Nutrition Education:   heart healthy eating principles  weight loss and management strategies  low sodium diet  maintaining hydration  nutrition for  lipid management  nutrition for Improved BG control  target goal for A1c <7.0  portion control    Readiness to change: Action:  (Changing  behavior)      PSYCHOSOCIAL GOALS AND PLAN    Psychosocial Assessment as it relates to rehabilitation:   Patient denies issues with his/her family or home life that may affect their rehabilitation efforts.     Patient's progress toward Psychosocial goals:    Pt is progressing and showing improvement  toward the following goals:  is maintaining regular exercise schedule MWF between CR sessions and weight training at the [facility]. Has attended board [facility] and is moving forward with his schedule for the year. He has been working on getting his father's house cleaned out and is looking forward to completing that task. No concerns at this time with depression or anxiety.  , Will continue to educate and progress as tolerated.    Psychosocial Intervention/plan:   Class: Stress and Your Health, Class: Relaxation, Exercise, and Keep a positive mindset    Psychosocial Education: signs/sxs of depression and benefits of a positive support system    Information to utilize [employer] was provided as well as contact information for counseling through  Behavioral Health and group psychotherapy groups available.    Readiness to change: Action:  (Changing behavior)      OTHER CORE COMPONENTS GOALS and PLAN      Blood Pressure will be monitored throughout the program and cardiologist will be notified of elevated trends.    Pt will be encouraged to monitor home BP if advised by cardiologist.    Tobacco Plan:   N/A:  Pt is a non-smoker    Progress toward Core Component goals:   Pt is progressing and showing improvement  toward the following goals:  BP has been WNL, showing normal hemodynamic response to exercise, taking medications as prescribed and decreasing salt intake to help manage BP.  , Will continue to educate and progress as tolerated.    Other Core Components Intervention:   group class: Understanding Heart Disease, group class: Common Heart Medications, and medication compliance    Group and  Individual Education:  understanding high blood pressure and it's relationship to CAD and components of blood pressure management    Readiness to change: Action:  (Changing behavior)

## 2025-04-09 ENCOUNTER — CLINICAL SUPPORT (OUTPATIENT)
Dept: CARDIAC REHAB | Facility: HOSPITAL | Age: 70
End: 2025-04-09
Payer: COMMERCIAL

## 2025-04-09 DIAGNOSIS — I21.4 NSTEMI (NON-ST ELEVATED MYOCARDIAL INFARCTION) (HCC): Primary | ICD-10-CM

## 2025-04-09 PROCEDURE — 93798 PHYS/QHP OP CAR RHAB W/ECG: CPT

## 2025-04-11 ENCOUNTER — CLINICAL SUPPORT (OUTPATIENT)
Dept: CARDIAC REHAB | Facility: HOSPITAL | Age: 70
End: 2025-04-11
Payer: COMMERCIAL

## 2025-04-11 DIAGNOSIS — I21.4 NSTEMI (NON-ST ELEVATED MYOCARDIAL INFARCTION) (HCC): Primary | ICD-10-CM

## 2025-04-11 PROCEDURE — 93798 PHYS/QHP OP CAR RHAB W/ECG: CPT

## 2025-04-14 ENCOUNTER — CLINICAL SUPPORT (OUTPATIENT)
Dept: CARDIAC REHAB | Facility: HOSPITAL | Age: 70
End: 2025-04-14
Payer: COMMERCIAL

## 2025-04-14 DIAGNOSIS — I21.4 NSTEMI (NON-ST ELEVATED MYOCARDIAL INFARCTION) (HCC): Primary | ICD-10-CM

## 2025-04-14 PROCEDURE — 93798 PHYS/QHP OP CAR RHAB W/ECG: CPT

## 2025-04-16 ENCOUNTER — CLINICAL SUPPORT (OUTPATIENT)
Dept: CARDIAC REHAB | Facility: HOSPITAL | Age: 70
End: 2025-04-16
Payer: COMMERCIAL

## 2025-04-16 DIAGNOSIS — I21.4 NSTEMI (NON-ST ELEVATED MYOCARDIAL INFARCTION) (HCC): Primary | ICD-10-CM

## 2025-04-16 PROCEDURE — 93798 PHYS/QHP OP CAR RHAB W/ECG: CPT

## 2025-04-18 ENCOUNTER — CLINICAL SUPPORT (OUTPATIENT)
Dept: CARDIAC REHAB | Facility: HOSPITAL | Age: 70
End: 2025-04-18
Payer: COMMERCIAL

## 2025-04-18 DIAGNOSIS — I21.4 NSTEMI (NON-ST ELEVATED MYOCARDIAL INFARCTION) (HCC): Primary | ICD-10-CM

## 2025-04-18 PROCEDURE — 93798 PHYS/QHP OP CAR RHAB W/ECG: CPT

## 2025-04-21 ENCOUNTER — CLINICAL SUPPORT (OUTPATIENT)
Dept: CARDIAC REHAB | Facility: HOSPITAL | Age: 70
End: 2025-04-21
Payer: COMMERCIAL

## 2025-04-21 DIAGNOSIS — I21.4 NSTEMI (NON-ST ELEVATED MYOCARDIAL INFARCTION) (HCC): Primary | ICD-10-CM

## 2025-04-21 PROCEDURE — 93798 PHYS/QHP OP CAR RHAB W/ECG: CPT

## 2025-04-23 ENCOUNTER — CLINICAL SUPPORT (OUTPATIENT)
Dept: CARDIAC REHAB | Facility: HOSPITAL | Age: 70
End: 2025-04-23
Payer: COMMERCIAL

## 2025-04-23 DIAGNOSIS — I21.4 NSTEMI (NON-ST ELEVATED MYOCARDIAL INFARCTION) (HCC): Primary | ICD-10-CM

## 2025-04-23 PROCEDURE — 93798 PHYS/QHP OP CAR RHAB W/ECG: CPT

## 2025-04-25 ENCOUNTER — CLINICAL SUPPORT (OUTPATIENT)
Dept: CARDIAC REHAB | Facility: HOSPITAL | Age: 70
End: 2025-04-25
Payer: COMMERCIAL

## 2025-04-25 DIAGNOSIS — I21.4 NSTEMI (NON-ST ELEVATED MYOCARDIAL INFARCTION) (HCC): Primary | ICD-10-CM

## 2025-04-25 PROCEDURE — 93798 PHYS/QHP OP CAR RHAB W/ECG: CPT

## 2025-04-28 ENCOUNTER — CLINICAL SUPPORT (OUTPATIENT)
Dept: CARDIAC REHAB | Facility: HOSPITAL | Age: 70
End: 2025-04-28
Payer: COMMERCIAL

## 2025-04-28 ENCOUNTER — OFFICE VISIT (OUTPATIENT)
Dept: DERMATOLOGY | Facility: CLINIC | Age: 70
End: 2025-04-28
Payer: COMMERCIAL

## 2025-04-28 VITALS — TEMPERATURE: 98.2 F

## 2025-04-28 DIAGNOSIS — I21.4 NSTEMI (NON-ST ELEVATED MYOCARDIAL INFARCTION) (HCC): Primary | ICD-10-CM

## 2025-04-28 DIAGNOSIS — L57.0 KERATOSIS, ACTINIC: Primary | ICD-10-CM

## 2025-04-28 PROCEDURE — 93798 PHYS/QHP OP CAR RHAB W/ECG: CPT

## 2025-04-28 PROCEDURE — 17000 DESTRUCT PREMALG LESION: CPT | Performed by: STUDENT IN AN ORGANIZED HEALTH CARE EDUCATION/TRAINING PROGRAM

## 2025-04-28 RX ORDER — FLUTICASONE PROPIONATE 50 MCG
SPRAY, SUSPENSION (ML) NASAL EVERY 12 HOURS
COMMUNITY

## 2025-04-28 NOTE — PROGRESS NOTES
"[facility] Dermatology Clinic Note     Patient Name: Brandon Thompson  Encounter Date: 4/28/25       Have you been cared for by a [facility] Dermatologist in the last 3 years and, if so, which description applies to you? Yes. I have been here within the last 3 years, and my medical history has NOT changed since that time. I am not of child-bearing potential.     REVIEW OF SYSTEMS:  Have you recently had or currently have any of the following? No changes in my recent health.   PAST MEDICAL HISTORY:  Have you personally ever had or currently have any of the following?  If \"YES,\" then please provide more detail. No changes in my medical history.   HISTORY OF IMMUNOSUPPRESSION: Do you have a history of any of the following:  Systemic Immunosuppression such as Diabetes, Biologic or Immunotherapy, Chemotherapy, Organ Transplantation, Bone Marrow Transplantation or Prednisone?  No     Answering \"YES\" requires the addition of the dotphrase \"IMMUNOSUPPRESSED\" as the first diagnosis of the patient's visit.   FAMILY HISTORY:  Any \"first degree relatives\" (parent, brother, sister, or child) with the following?    No changes in my family's known health.   PATIENT EXPERIENCE:    Do you want the Dermatologist to perform a COMPLETE skin exam today including a clinical examination under the \"bra and underwear\" areas?  Yes  If necessary, do we have your permission to call and leave a detailed message on your Preferred Phone number that includes your specific medical information?  Yes      Allergies   Allergen Reactions   • Penicillins Edema and Swelling   • Sulfa Antibiotics Edema and Swelling      Current Outpatient Medications:   •  aspirin 81 mg chewable tablet, Chew 81 mg daily, Disp: , Rfl:   •  atorvastatin (LIPITOR) 80 mg tablet, Take 1 tablet (80 mg total) by mouth daily with dinner (Patient taking differently: Take 40 mg by mouth daily with dinner), Disp: 30 tablet, Rfl: 0  •  clindamycin (CLEOCIN) 300 MG capsule, Take 2 " capsules by mouth (Patient not taking: Reported on 2/6/2025), Disp: , Rfl:   •  fenofibrate (TRICOR) 145 mg tablet, Take 1 tablet by mouth daily, Disp: , Rfl:   •  fluticasone (Flonase Allergy Relief) 50 mcg/act nasal spray, Every 12 hours, Disp: , Rfl:   •  Folic Acid 0.8 MG CAPS, every 24 hours, Disp: , Rfl:   •  lisinopril (ZESTRIL) 10 mg tablet, Take 1 tablet (10 mg total) by mouth daily Do not start before February 1, 2025., Disp: 30 tablet, Rfl: 0  •  metoprolol succinate (TOPROL-XL) 50 mg 24 hr tablet, Take 1 tablet (50 mg total) by mouth daily, Disp: 30 tablet, Rfl: 0  •  multivitamin (THERAGRAN) TABS, Take 1 tablet by mouth daily, Disp: , Rfl:   •  nitroglycerin (NITROSTAT) 0.4 mg SL tablet, Place 1 tablet (0.4 mg total) under the tongue every 5 (five) minutes as needed for chest pain (Patient not taking: Reported on 2/6/2025), Disp: 10 tablet, Rfl: 0  •  Omega-3 1000 MG CAPS, Take 2 g by mouth daily, Disp: , Rfl:   •  Potassium 99 MG TABS, every 24 hours, Disp: , Rfl:   •  ticagrelor (BRILINTA) 90 MG, Take 1 tablet (90 mg total) by mouth every 12 (twelve) hours, Disp: 60 tablet, Rfl: 0              Whom besides the patient is providing clinical information about today's encounter?   NO ADDITIONAL HISTORIAN (patient alone provided history)    Physical Exam and Assessment/Plan by Diagnosis:    ACTINIC KERATOSES    Physical Exam:  Anatomic Location Affected:  left helix   Morphological Description:  Thin pink papule(s) with gritty scale       Assessment and Plan:  Based on a thorough discussion of this condition and the management approach to it (including a comprehensive discussion of the known risks, side effects and potential benefits of treatment), the patient (family) agrees to implement the following specific plan:  Treated with cryotherapy today; written and verbal consent obtained     PROCEDURE:  DESTRUCTION OF PRE-MALIGNANT LESIONS  After a thorough discussion of treatment options and  risk/benefits/alternatives (including but not limited to local pain, scarring, dyspigmentation, blistering, and possible superinfection), verbal and written consent were obtained and the aforementioned lesions were treated on with cryotherapy using liquid nitrogen x 2 cycles for 5-10 seconds. The patient tolerated the procedure well, and after-care instructions were provided.     TOTAL NUMBER of 1 pre-malignant lesions were treated today on the ANATOMIC LOCATION: left helix.       Patient instructions:  Your pre-cancerous lesions (called actinic keratosis) were treated with liquid nitrogen today. The treated areas will get more red, crusted over the next few days. There might be some blistering. Apply vaseline to the treated area for the next week to help it heal fully. Do not pick at the area. Return in 3-4 weeks for another round of liquid nitrogen treatment if lesion(s)  fails to fully resolve.      Scribe Attestation    I,:   am acting as a scribe while in the presence of the attending physician.:       I,:   personally performed the services described in this documentation    as scribed in my presence.:

## 2025-04-30 ENCOUNTER — CLINICAL SUPPORT (OUTPATIENT)
Dept: CARDIAC REHAB | Facility: HOSPITAL | Age: 70
End: 2025-04-30
Payer: COMMERCIAL

## 2025-04-30 DIAGNOSIS — I21.4 NSTEMI (NON-ST ELEVATED MYOCARDIAL INFARCTION) (HCC): Primary | ICD-10-CM

## 2025-04-30 PROCEDURE — 93798 PHYS/QHP OP CAR RHAB W/ECG: CPT

## 2025-05-02 ENCOUNTER — CLINICAL SUPPORT (OUTPATIENT)
Dept: CARDIAC REHAB | Facility: HOSPITAL | Age: 70
End: 2025-05-02
Payer: COMMERCIAL

## 2025-05-02 DIAGNOSIS — I21.4 NSTEMI (NON-ST ELEVATED MYOCARDIAL INFARCTION) (HCC): Primary | ICD-10-CM

## 2025-05-02 PROCEDURE — 93798 PHYS/QHP OP CAR RHAB W/ECG: CPT

## 2025-05-05 ENCOUNTER — CLINICAL SUPPORT (OUTPATIENT)
Dept: CARDIAC REHAB | Facility: HOSPITAL | Age: 70
End: 2025-05-05
Payer: COMMERCIAL

## 2025-05-05 DIAGNOSIS — I21.4 NSTEMI (NON-ST ELEVATED MYOCARDIAL INFARCTION) (HCC): Primary | ICD-10-CM

## 2025-05-05 PROCEDURE — 93798 PHYS/QHP OP CAR RHAB W/ECG: CPT

## 2025-05-06 ENCOUNTER — HOSPITAL ENCOUNTER (OUTPATIENT)
Dept: NON INVASIVE DIAGNOSTICS | Facility: HOSPITAL | Age: 70
Discharge: HOME | End: 2025-05-06
Payer: COMMERCIAL

## 2025-05-06 VITALS
BODY MASS INDEX: 29.82 KG/M2 | WEIGHT: 225 LBS | DIASTOLIC BLOOD PRESSURE: 78 MMHG | HEART RATE: 55 BPM | SYSTOLIC BLOOD PRESSURE: 124 MMHG | HEIGHT: 73 IN

## 2025-05-06 DIAGNOSIS — I25.5 ISCHEMIC CARDIOMYOPATHY: ICD-10-CM

## 2025-05-06 PROCEDURE — 93308 TTE F-UP OR LMTD: CPT

## 2025-05-06 PROCEDURE — 93308 TTE F-UP OR LMTD: CPT | Performed by: INTERNAL MEDICINE

## 2025-05-06 RX ADMIN — PERFLUTREN 0.4 ML/MIN: 6.52 INJECTION, SUSPENSION INTRAVENOUS at 13:39

## 2025-05-07 ENCOUNTER — CLINICAL SUPPORT (OUTPATIENT)
Dept: CARDIAC REHAB | Facility: HOSPITAL | Age: 70
End: 2025-05-07
Payer: COMMERCIAL

## 2025-05-07 DIAGNOSIS — I21.4 NSTEMI (NON-ST ELEVATED MYOCARDIAL INFARCTION) (HCC): Primary | ICD-10-CM

## 2025-05-07 LAB
BSA FOR ECHO PROCEDURE: 2.26 M2
E WAVE DECELERATION TIME: 279 MS
E/A RATIO: 0.67
FRACTIONAL SHORTENING: 26 (ref 28–44)
INTERVENTRICULAR SEPTUM IN DIASTOLE (PARASTERNAL SHORT AXIS VIEW): 1.2 CM
INTERVENTRICULAR SEPTUM: 1.2 CM (ref 0.6–1.1)
LEFT INTERNAL DIMENSION IN SYSTOLE: 3.7 CM (ref 2.1–4)
LEFT VENTRICULAR INTERNAL DIMENSION IN DIASTOLE: 5 CM (ref 3.5–6)
LEFT VENTRICULAR POSTERIOR WALL IN END DIASTOLE: 1.3 CM
LEFT VENTRICULAR STROKE VOLUME: 59 ML
LV EF US.2D.A4C+ESTIMATED: 49 %
LVSV (TEICH): 59 ML
MV PEAK A VEL: 0.7 M/S
MV PEAK E VEL: 47 CM/S
MV STENOSIS PRESSURE HALF TIME: 81 MS
MV VALVE AREA P 1/2 METHOD: 2.72
SL CV LV EF: 55
SL CV PED ECHO LEFT VENTRICLE DIASTOLIC VOLUME (MOD BIPLANE) 2D: 117 ML
SL CV PED ECHO LEFT VENTRICLE SYSTOLIC VOLUME (MOD BIPLANE) 2D: 58 ML

## 2025-05-07 PROCEDURE — 93798 PHYS/QHP OP CAR RHAB W/ECG: CPT

## 2025-05-07 NOTE — PROGRESS NOTES
CARDIAC REHABILITATION   ASSESSMENT AND INDIVIDUALIZED TREATMENT PLAN  DISCHARGE            Today's date: 2025   # of Exercise Sessions Completed: 36  Patient name: Brandon Thompson      : 1955  Age: 70 y.o.       MRN: [id number]  Referring Physician: Dr. Jefe Woods  Cardiologist: Dr. Jefe Woods  Provider: Luiz  Clinician: Mary Ann Smith MS, CEP      Treatment is tailored to this patient's individual needs.  The ITP was reviewed with the patient and all questions were answered to their satisfaction.  Additional ITP documentation can be found electronically including daily and monthly exercise summaries, daily session notes with ECG summaries, education notes, daily medication reconciliation, and daily physician supervision.      DISCHARGE SUMMARY  May 7, 2025    Completed 36/36 exercise sessions  Functional capacity:   Pre: 3.08 METs, Post: 7.5 METs (143% change)  Max METs tolerated in cardiac rehab:  Pre: 2.6,  Post: 5.7 (119% change)  [facility] QOL:  Pre: 12, Post: 15  PHQ-9:  Pre: 0, Post: 2  Weight:  Pre: 227.2,  Post: 207.6 (-19.6 lbs)  Exercise session details:  60 minutes,  5.7 METs  Resting BP  102/68 - 122/70,  HR 59 - 72  Exercise /72- 144/80.  -141   Telemetry:  NSR, PVCs  Symptoms: none  Discharge Plans: plans to continue exercise at [facility] 3x/week with goal of 150-200 min/week  Patient's subjective report of progress: Avelino did great in his cardiac rehab program. He reports he has learned what aerobic exercise is and what levels he needs to work at to increase his heart rate to get the most benefit from exercise. He has lost 19.6 lbs since staring rehab contributing exercise and changes in his diet to success. He does not have a specific weight goal, but would like to see if he can continue losing more weight at this time.   Clinical Comments: He has done very well progressing his exercise times and intensities and tolerates exercise well. He showed 143% change in  METs on his submax TM ETT.         Dx:   Encounter Diagnosis   Name Primary?    NSTEMI (non-ST elevated myocardial infarction) ([facility]) Yes       Description of Diagnosis: NSTEMI, status post angioplasty with Willis frontier drug-eluting stent placed across the 90% stenosis, 0% residual stenosis.   Date of onset: 1/28/2025  Other Cardiac History: none        ASSESSMENT    Medical History:   No past medical history on file.    Family History:  No family history on file.    Allergies:   Penicillins and Sulfa antibiotics    Current Medications:   Current Outpatient Medications   Medication Sig Dispense Refill    aspirin 81 mg chewable tablet Chew 81 mg daily      atorvastatin (LIPITOR) 80 mg tablet Take 1 tablet (80 mg total) by mouth daily with dinner (Patient taking differently: Take 40 mg by mouth daily with dinner) 30 tablet 0    clindamycin (CLEOCIN) 300 MG capsule Take 2 capsules by mouth (Patient not taking: Reported on 2/6/2025)      fenofibrate (TRICOR) 145 mg tablet Take 1 tablet by mouth daily      fluticasone (Flonase Allergy Relief) 50 mcg/act nasal spray Every 12 hours      Folic Acid 0.8 MG CAPS every 24 hours      lisinopril (ZESTRIL) 10 mg tablet Take 1 tablet (10 mg total) by mouth daily Do not start before February 1, 2025. 30 tablet 0    metoprolol succinate (TOPROL-XL) 50 mg 24 hr tablet Take 1 tablet (50 mg total) by mouth daily 30 tablet 0    multivitamin (THERAGRAN) TABS Take 1 tablet by mouth daily      nitroglycerin (NITROSTAT) 0.4 mg SL tablet Place 1 tablet (0.4 mg total) under the tongue every 5 (five) minutes as needed for chest pain (Patient not taking: Reported on 2/6/2025) 10 tablet 0    Omega-3 1000 MG CAPS Take 2 g by mouth daily      Potassium 99 MG TABS every 24 hours      ticagrelor (BRILINTA) 90 MG Take 1 tablet (90 mg total) by mouth every 12 (twelve) hours 60 tablet 0     No current facility-administered medications for this visit.       Medication compliance: Yes   Comments: Pt  "reports to be compliant with medications    Physical Limitations: Hip burning with walking    Fall Risk: Low   Comments: Ambulates with a steady gait with no assist device    Cultural needs: none      CAD Risk Factors:  Cholesterol: Yes  HTN: Yes  DM: No  Obesity: No   Inactivity: No      EXERCISE ASSESSMENT:     Initial Fitness Assessment: Submaximal TM ETT:  Resting:  BP: 142/78  HR: 83, Exercise:  BP: 148/78  HR: 115, METs:  3.08, ECG Summary: NSR, and Test terminated at:  RHR +30    Post Fitness Assessment: Submaximal TM ETT:  Resting:  BP: 112/68  HR: 72, Exercise:  BP: 158/80  HR: 150, METs:  7.5, ECG Summary: NSR, and Test terminated at: RPE 6    Current Functional Status:  Occupation: retired  Recreation/Physical Activity: regular exercise at [facility] 3x/week   ADL’s:No limitations  [place]: No limitations  Home exercise: none  Other Comments: n/a      SMART Exercise Goals:   10% improvement in functional capacity based on max METs achieved in initial fitness assessment  improved DASI score by 10%  increased exercise capacity by 40% based on peak METs tolerated in cardiac rehab exercise session  maintain > 150 minutes per week of moderate intensity exercise    Patient Specific EXERCISE GOALS:       Return to regular exercise routine at the [facility] 3x/week  Learn more about exercise program needed for heart health, THR, guildelines  Increase energy levels post MI    Functional Capacity Screening Tool:  [facility] Activity Status Index:  8.54 METs  Post: 9.89 METs    NUTRITION ASSESSMENT:    Initial Weight:  227.2 lb  Current Weight: 207.6 lb    Height:   Ht Readings from Last 1 Encounters:   05/06/25 6' 1\" (1.854 m)       Rate Your Plate Score: 57/81  Post: 68    Diabetes: N/A  A1c: 6.2    last measured: 1/28/2025    Lipid management: Discussed diet and lipid management and Last lipid profile 1/28/2025  Chol 118  TRG 85  HDL 36  LDL 65    Current Dietary Habits:  Decreasing meat intake, eating less deshpande. Has lost 20 " lbs over 2 months prior to MI    SMART Nutrition Goals:   LDL <100, HDL >40, TRG <150, and CHOL <200    Patient Specific NUTRITION GOALS:     1. Continue eating less meat and less deshpande weekly   2. Learn more about heart healthy eating and label reading   3. Continue losing weight       Drug/Alcohol Use:   No      PSYCHOSOCIAL ASSESSMENT:    Date of last Assessment: 5/7/2025  Depression screening:  PHQ-9 = 2  Interpretation:  0 =No Depression  Anxiety screening:  ANGEL-7 = 0    Interpretation: 0-4  = Not anxious    Pt self-report of depression and anxiety   Patient reports they are coping well with good social support and denies depression or anxiety  Reports sufficient emotional support     Self-reported stress level:  3   Stressors:  father recently passed away and then had MI, is working on [employer] now. He had been used to caring for his father and spending a lot of time with him before he passed. He is now trying to establish a routine for himself.  Stress Management Tools: practice Relaxation Techniques, exercise, keep a positive mindset, enjoy a hobby, and spend time with family    SMART Psychosocial Goals:     Physical Fitness in [facility] Score < 3 and feel less tired with more energy    Patient Specific PSYCHOCOSOCIAL GOALS:    Find a routine for himself after spending so much time caring for his father  Continue volunteering as  at [facility].    Quality of Life Screen:  (Higher score indicates disease impact on QOL)  [facility] COOP score: 12/45  Post: 15/45     Social Support:   spouse and friends  Community/Social Activities:  at [facility]     Psychosocial Assessment as it relates to rehabilitation:   Patient denies issues with his/her family or home life that may affect their rehabilitation efforts.       OTHER CORE COMPONENT ASSESSMENT:    Tobacco Use:     N/A:  Patient is a non-smoker     Anginal Symptoms:   chest tightness, sweating   NTG use:  No prescription    SMART Goals:   consistent, controlled resting BP < 130/80 and medication compliance    Patient Specific CORE COMPONENT GOALS:    Manage BP with diet, exercise, and medications  Return to regular exercise routine to help manage CAD        INDIVIDUALIZED TREATMENT PLAN      EXERCISE GOALS and PLAN      Progress toward Exercise goals:   Goals met:  increased strength and endurance with regular exercise, showed increased functional capacity with increasing MET levels-reaching 4.3-5.8 METs (119% change), established a regular exercise program in CR and has plan to return to [facility] 3x/week to complete 150-200 min/week aerobic exercise. He reports he has appreciated the education he has received and has a new appreciation for aerobic exercise and has learned THR and how he should feel exercising to gain the most benefit., Patient will be encouraged to focus on lifestyle modifications following discharge.    Exercise Plan:      Patient education:   Exercise After Cardiac Rehabilitation and After discharge patient will continue to follow a regular exercise regimen with the goal of a minimum of 150 minutes per week of moderate intensity exercise.      The patient was counseled on exercise guidelines to achieve a minimum of 150 mins/wk of moderate intensity (RPE 4-6)   exercise and encouraged to add 1-2 days of exercise on opposite days of cardiac rehab as tolerated.       PHYSICIAN PRESCRIBED EXERCISE:    Current Aerobic Exercise Prescription:      Frequency: 3 days/week   Supplement with home exercise 2+ days/wk as tolerated       Minutes: 45-55         METS: 4.3-5.8          HR: RHR +30-40bpm   RPE: 4-6         Modalities: Treadmill, UBE, Rower, NuStep, and Recumbent bike     Exercise workloads will be progressed gradually as tolerated, within limits of patient's ability, and according to the patient's   response to the exercise program.    Exercise Progression after Discharge:    Frequency: 3-5 days of gym  or home exercise   Minutes: 30-50  >150 mins/wk of moderate intensity exercise   METS: 4.3 - 5.8   HR: 98 - 128    RPE: 4-6   Modalities: Treadmill, UBE, Lifecycle, Elliptical, Rower, NuStep, and Recumbent bike      Strength trainin-3 days / week  12-15 repetitions  1-2 sets per modality -Avelino is completing weight training program at the [facility] MWF after he leaves his CR sessions to maintain his scheduled routine at the [facility]. Weights will not be part of his CR sessions.   Modalities: Pull Downs, Lateral Raise, Arm Extension, Arm Curl, Front Raises, Shoulder Shrugs, and leg ext    Home Exercise: has returned to [facility] 3x/week    Exercise Education: benefit of exercise for CAD risk factors, home exercise guidelines, AHA guidelines to achieve >150 mins/wk of moderate exercise, RPE scale, and Group class: Risk Factors for Heart Disease     Readiness to change: Maintenance: (Maintaining the behavior change)      NUTRITION GOALS AND PLAN      Nutritional   Reviewed patient's Rate your Plate. Discussed key elements of heart healthy eating. Reviewed patient goals for dietary modifications and their clinical implications.  Reviewed most recent lipid profile.     Patient's progress toward Nutrition goals:    Goals met: has been making healthier eating choices and decreasing portion sizes, has decreased amount of deshpande he is eating, doing well with weight loss goal and has lost 19.6 lbs since starting CR (207.6 lb current) Plans to continue working on weight loss-no goal at this time., Patient will be encouraged to focus on lifestyle modifications following discharge.      Nutrition Plan:   eat red meat once a week or less, choose lean red meat, and After discharge patient will continue to maintain healthy lifestyle habits and focus on risk factor modification.    Measurable goals were based Rate Your Plate Dietary Self-Assessment. These are the areas in which the patient could score higher on the assessment.  Goals include  recommendations for a heart healthy diet based on American Heart Association.    Nutrition Education:   heart healthy eating principles  weight loss and management strategies  low sodium diet  maintaining hydration  nutrition for  lipid management  nutrition for Improved BG control  target goal for A1c <7.0  portion control  group class: Heart Healthy Eating  group class:  Label Reading    Readiness to change: Action:  (Changing behavior)      PSYCHOSOCIAL GOALS AND PLAN    Psychosocial Assessment as it relates to rehabilitation:   Patient denies issues with his/her family or home life that may affect their rehabilitation efforts.     Patient's progress toward Psychosocial goals:    Goals met: is maintaining regular exercise schedule MWF between CR sessions and weight training at the [facility]. Has attended board meetings at the [facility] and is moving forward with his schedule for the year. No concerns at this time with depression or anxiety., Patient will be encouraged to focus on lifestyle modifications following discharge.    Psychosocial Intervention/plan:   Class: Stress and Your Health, Class: Relaxation, Exercise, and Keep a positive mindset    Psychosocial Education: signs/sxs of depression, benefits of a positive support system, class:  Relaxation, and class:  Stress and Your Health     Information to utilize [employer] was provided as well as contact information for counseling through  Behavioral Health and group psychotherapy groups available.    Readiness to change: Action:  (Changing behavior)      OTHER CORE COMPONENTS GOALS and PLAN      Blood Pressure will be monitored throughout the program and cardiologist will be notified of elevated trends.    Pt will be encouraged to monitor home BP if advised by cardiologist.    Tobacco Plan:   N/A:  Pt is a non-smoker    Progress toward Core Component goals:   Goals met: BP has been WNL, showing normal hemodynamic response to exercise, taking  medications as prescribed and decreasing salt intake to help manage BP.., Patient will be encouraged to focus on lifestyle modifications following discharge.    Other Core Components Intervention:   group class: Understanding Heart Disease, group class: Common Heart Medications, and medication compliance    Group and Individual Education:  understanding high blood pressure and it's relationship to CAD, components of blood pressure management, group class: Understanding Heart Disease, and group class:  Common Heart Medications    Readiness to change: Action:  (Changing behavior)

## 2025-05-08 ENCOUNTER — RESULTS FOLLOW-UP (OUTPATIENT)
Dept: CARDIOLOGY CLINIC | Facility: CLINIC | Age: 70
End: 2025-05-08

## 2025-05-12 ENCOUNTER — NURSE TRIAGE (OUTPATIENT)
Age: 70
End: 2025-05-12

## 2025-05-12 NOTE — TELEPHONE ENCOUNTER
"FOLLOW UP: Pt calling to ask if and when he would be able to hold his Brilinta and Aspirin for a coloscopy.  Pt was due to a routine coloscopy around the time of his MI in January and his PCP advised him to do an at home cologard test which came back positive.  Please review and advise, thank you.    REASON FOR CONVERSATION: Medication Problem    SYMPTOMS: denies     DISPOSITION: Callback by PCP Today          Reason for Disposition   Caller has NON-URGENT medicine question about med that PCP or specialist prescribed and triager unable to answer question    Answer Assessment - Initial Assessment Questions  1. NAME of MEDICINE: \"What medicine(s) are you calling about?\"      Brilinta/Asa  2. QUESTION: \"What is your question?\" (e.g., double dose of medicine, side effect)      Would he be able to hold these medications for a colonoscopy  3. PRESCRIBER: \"Who prescribed the medicine?\" Reason: if prescribed by specialist, call should be referred to that group.      Dr Pyle  4. SYMPTOMS: \"Do you have any symptoms?\" If Yes, ask: \"What symptoms are you having?\"  \"How bad are the symptoms (e.g., mild, moderate, severe)      Denies    Protocols used: Medication Question Call-Adult-[place]    "

## 2025-05-12 NOTE — TELEPHONE ENCOUNTER
Pt called back stating that he understands he cannot stop his blood thinners however he wanted to know if he is cleared from a cardiac standpoint to proceed with a colonoscopy.

## 2025-06-02 ENCOUNTER — OFFICE VISIT (OUTPATIENT)
Dept: DERMATOLOGY | Facility: CLINIC | Age: 70
End: 2025-06-02
Payer: COMMERCIAL

## 2025-06-02 VITALS — TEMPERATURE: 96.5 F | WEIGHT: 204 LBS | BODY MASS INDEX: 26.91 KG/M2

## 2025-06-02 DIAGNOSIS — L57.0 KERATOSIS, ACTINIC: Primary | ICD-10-CM

## 2025-06-02 PROCEDURE — 17000 DESTRUCT PREMALG LESION: CPT | Performed by: REGISTERED NURSE

## 2025-06-02 PROCEDURE — 17003 DESTRUCT PREMALG LES 2-14: CPT | Performed by: REGISTERED NURSE

## 2025-06-02 NOTE — PROGRESS NOTES
"[facility] Dermatology Clinic Note     Patient Name: Brandon Thompson  Encounter Date: 6/2/25       Have you been cared for by a [facility] Dermatologist in the last 3 years and, if so, which description applies to you? Yes. I have been here within the last 3 years, and my medical history has NOT changed since that time. I am not of child-bearing potential.     REVIEW OF SYSTEMS:  Have you recently had or currently have any of the following? No changes in my recent health.   PAST MEDICAL HISTORY:  Have you personally ever had or currently have any of the following?  If \"YES,\" then please provide more detail. No changes in my medical history.   HISTORY OF IMMUNOSUPPRESSION: Do you have a history of any of the following:  Systemic Immunosuppression such as Diabetes, Biologic or Immunotherapy, Chemotherapy, Organ Transplantation, Bone Marrow Transplantation or Prednisone?  No     Answering \"YES\" requires the addition of the dotphrase \"IMMUNOSUPPRESSED\" as the first diagnosis of the patient's visit.   FAMILY HISTORY:  Any \"first degree relatives\" (parent, brother, sister, or child) with the following?    No changes in my family's known health.   PATIENT EXPERIENCE:    Do you want the Dermatologist to perform a COMPLETE skin exam today including a clinical examination under the \"bra and underwear\" areas?  NO  If necessary, do we have your permission to call and leave a detailed message on your Preferred Phone number that includes your specific medical information?  Yes      Allergies[1] Current Medications[2]        Whom besides the patient is providing clinical information about today's encounter?   NO ADDITIONAL HISTORIAN (patient alone provided history)    Physical Exam and Assessment/Plan by Diagnosis:    ACTINIC KERATOSES     Physical Exam:  Anatomic Location Affected:  left helix   Morphological Description:  Thin pink papule(s) with gritty scale      Additional history: Lesion treated with cryotherapy on 4/28/25. " Patient reports that it is still rough in texture. He notes that it is itchy sometimes and reports pain once while sleeping on that side.    Assessment and Plan:  Based on a thorough discussion of this condition and the management approach to it (including a comprehensive discussion of the known risks, side effects and potential benefits of treatment), the patient (family) agrees to implement the following specific plan:  Treated with cryotherapy today; written and verbal consent obtained   Reach out to us for further evaluation if he notices a growth, ulcerating, bleeding, tenderness.      PROCEDURE:  DESTRUCTION OF PRE-MALIGNANT LESIONS  After a thorough discussion of treatment options and risk/benefits/alternatives (including but not limited to local pain, scarring, dyspigmentation, blistering, and possible superinfection), verbal and written consent were obtained and the aforementioned lesions were treated on with cryotherapy using liquid nitrogen x 2 cycles for 5-10 seconds. The patient tolerated the procedure well, and after-care instructions were provided.      TOTAL NUMBER of 2 pre-malignant lesions were treated today on the ANATOMIC LOCATION: left helix.             Patient instructions:  Your pre-cancerous lesions (called actinic keratosis) were treated with liquid nitrogen today. The treated areas will get more red, crusted over the next few days. There might be some blistering. Apply vaseline to the treated area for the next week to help it heal fully. Do not pick at the area. Return in 3-4 weeks for another round of liquid nitrogen treatment if lesion(s)  fails to fully resolve.    Meka Shultz MD  Dermatology, PGY-2  Scribe Attestation      I,:  Reta Hollins MA am acting as a scribe while in the presence of the attending physician.:       I,:  Edgar Alva MD personally performed the services described in this documentation    as scribed in my presence.:                [1]    Allergies  Allergen Reactions    Penicillins Edema and Swelling    Sulfa Antibiotics Edema and Swelling   [2]   Current Outpatient Medications:     aspirin 81 mg chewable tablet, Chew 81 mg daily, Disp: , Rfl:     atorvastatin (LIPITOR) 80 mg tablet, Take 1 tablet (80 mg total) by mouth daily with dinner (Patient taking differently: Take 40 mg by mouth daily with dinner), Disp: 30 tablet, Rfl: 0    clindamycin (CLEOCIN) 300 MG capsule, Take 2 capsules by mouth (Patient not taking: Reported on 2/6/2025), Disp: , Rfl:     fenofibrate (TRICOR) 145 mg tablet, Take 1 tablet by mouth daily, Disp: , Rfl:     fluticasone (Flonase Allergy Relief) 50 mcg/act nasal spray, Every 12 hours, Disp: , Rfl:     Folic Acid 0.8 MG CAPS, every 24 hours, Disp: , Rfl:     lisinopril (ZESTRIL) 10 mg tablet, Take 1 tablet (10 mg total) by mouth daily Do not start before February 1, 2025., Disp: 30 tablet, Rfl: 0    metoprolol succinate (TOPROL-XL) 50 mg 24 hr tablet, Take 1 tablet (50 mg total) by mouth daily, Disp: 30 tablet, Rfl: 0    multivitamin (THERAGRAN) TABS, Take 1 tablet by mouth daily, Disp: , Rfl:     nitroglycerin (NITROSTAT) 0.4 mg SL tablet, Place 1 tablet (0.4 mg total) under the tongue every 5 (five) minutes as needed for chest pain (Patient not taking: Reported on 2/6/2025), Disp: 10 tablet, Rfl: 0    Omega-3 1000 MG CAPS, Take 2 g by mouth daily, Disp: , Rfl:     Potassium 99 MG TABS, every 24 hours, Disp: , Rfl:     ticagrelor (BRILINTA) 90 MG, Take 1 tablet (90 mg total) by mouth every 12 (twelve) hours, Disp: 60 tablet, Rfl: 0

## 2025-06-05 ENCOUNTER — OFFICE VISIT (OUTPATIENT)
Dept: CARDIOLOGY CLINIC | Facility: CLINIC | Age: 70
End: 2025-06-05
Payer: COMMERCIAL

## 2025-06-05 VITALS
HEIGHT: 73 IN | SYSTOLIC BLOOD PRESSURE: 138 MMHG | HEART RATE: 58 BPM | BODY MASS INDEX: 27.46 KG/M2 | WEIGHT: 207.2 LBS | DIASTOLIC BLOOD PRESSURE: 70 MMHG

## 2025-06-05 DIAGNOSIS — I10 ESSENTIAL HYPERTENSION: ICD-10-CM

## 2025-06-05 DIAGNOSIS — Z95.5 PRESENCE OF DRUG-ELUTING STENT IN RIGHT CORONARY ARTERY: ICD-10-CM

## 2025-06-05 DIAGNOSIS — E78.2 MIXED HYPERLIPIDEMIA: ICD-10-CM

## 2025-06-05 DIAGNOSIS — I25.10 CORONARY ARTERY DISEASE INVOLVING NATIVE CORONARY ARTERY OF NATIVE HEART WITHOUT ANGINA PECTORIS: Primary | ICD-10-CM

## 2025-06-05 DIAGNOSIS — I35.0 MILD AORTIC STENOSIS: ICD-10-CM

## 2025-06-05 PROCEDURE — 99214 OFFICE O/P EST MOD 30 MIN: CPT | Performed by: INTERNAL MEDICINE

## 2025-06-05 NOTE — PROGRESS NOTES
Cardiology Follow Up    Brandon Thompson  1955  [id number]  [facility] [place]  [address]  [facility] [address]  [facility] [id number] [phone]    1. Coronary artery disease involving native coronary artery of native heart without angina pectoris        2. Essential hypertension        3. Mixed hyperlipidemia        4. Presence of drug-eluting stent in right coronary artery        5. Mild aortic stenosis            Discussion/Summary:    CAD - Mr. Thompson had an NSTEMI in January resulting in a drug-eluting stent to the mid RCA.  No other significant CAD.  He will be on dual antiplatelet therapy for a minimum of 1 year.  He is also on beta-blocker and statin.  He is asymptomatic and completed cardiac rehabilitation.  His ejection fraction returned to normal.  No changes were made today and we will see him back in 6 months.    Hypertension - He continues on Toprol-XL and lisinopril.  No changes were made and his blood pressure is under good control.    Hyperlipidemia - He is on atorvastatin 80 mg daily and fenofibrate.  He recently had updated blood work and we will obtain this for our review.    Mild aortic stenosis - Found on echocardiogram.  No issues from this standpoint and we will follow periodic echoes into the future.      Interval History:     Mr. Thompson comes in for follow-up given his history of CAD.  I met Brandon during a hospitalization in late January in which he presented with chest burning that had been occurring on and off over the prior month, but intensified on the day of admission.  Prior to this he had no cardiac history and was treated for hypertension/hyperlipidemia.  His workup showed subtle ST elevations in the inferior leads and he had a significantly elevated troponin.  His echocardiogram showed a mildly reduced systolic function with inferior to inferior lateral regional wall motion abnormalities.  He  also had mild aortic stenosis.    At that point he was taken up to the cardiac catheterization lab in [place] and was found to have single-vessel CAD.  He had a 90% mid RCA lesion that received PCI/drug-eluting stent.  This went well and no symptoms have returned.  He was placed on dual antiplatelet therapy and other appropriate medical therapy.  In follow-up he was feeling well.  He has completed cardiac rehabilitation and his stamina has significantly improved.  He is exercising regularly on his own.  He had a follow-up echocardiogram that showed an improved ejection fraction to 55%.    Brandon has not had any return of angina type symptoms.  No chest pain or any other symptoms suggestive of angina.  He denies shortness of breath or any signs/symptoms of CHF.  He denies palpitations, lightheadedness or syncope.  He is active without limitations.    Problem List[1]  Past Medical History[2]  Social History     Socioeconomic History    Marital status: /Civil Union     Spouse name: Not on file    Number of children: Not on file    Years of education: Not on file    Highest education level: Not on file   Occupational History    Not on file   Tobacco Use    Smoking status: Former     Types: Cigarettes    Smokeless tobacco: Never   Substance and Sexual Activity    Alcohol use: Yes     Alcohol/week: 1.0 standard drink of alcohol     Types: 1 Standard drinks or equivalent per week    Drug use: Never    Sexual activity: Not Currently   Other Topics Concern    Not on file   Social History Narrative    Not on file     Social Drivers of Health     Financial Resource Strain: Not on file   Food Insecurity: No Food Insecurity (1/29/2025)    Nursing - Inadequate Food Risk Classification     Worried About Running Out of Food in the Last Year: Not on file     Ran Out of Food in the Last Year: Not on file     Ran Out of Food in the Last Year: Never true   Transportation Needs: No Transportation Needs (1/29/2025)    Nursing -  Transportation Risk Classification     Lack of Transportation: Not on file     Lack of Transportation: No   Physical Activity: Not on file   Stress: Not on file   Social Connections: Not on file   Intimate Partner Violence: Unknown (2025)    Nursing IPS     Feels Physically and Emotionally Safe: Not on file     Physically Hurt by Someone: Not on file     Humiliated or Emotionally Abused by Someone: Not on file     Physically Hurt by Someone: No     Hurt or Threatened by Someone: No   Housing Stability: Unknown (2025)    Nursing: Inadequate Housing Risk Classification     Has Housing: Not on file     Worried About Losing Housing: Not on file     Unable to Get Utilities: Not on file     Unable to Pay for Housing in the Last Year: No     Has Housin      Family History[3]  Past Surgical History[4]  Current Medications[5]  Allergies   Allergen Reactions    Penicillins Edema and Swelling    Sulfa Antibiotics Edema and Swelling       Labs:  Lab Results   Component Value Date     2015    K 4.4 2025    K 4.3 2015    CL 99 2025     2015    CO2 27 2025    CO2 30 2015    BUN 23 2025    BUN 22 2015    CREATININE 1.40 (H) 2025    CREATININE 1.25 2015    GLUCOSE 101 2015    CALCIUM 9.3 2025    CALCIUM 9.5 2015     Lab Results   Component Value Date    WBC 8.99 2025    WBC 8.97 2015    HGB 15.0 2025    HGB 11.4 (L) 2015    HCT 46.5 2025    HCT 33.6 (L) 2015    MCV 95 2025    MCV 90 2015     2025     2015     Lab Results   Component Value Date    TRIG 85 2025    HDL 36 (L) 2025     Imaging:     Echo follow up/limited w/ contrast if indicated  Result Date: 2025  Narrative:   Left Ventricle: Left ventricular cavity size is normal. Wall thickness is mildly increased. There is mild concentric hypertrophy. The left ventricular ejection  "fraction is 55%. Systolic function is normal. Although no diagnostic regional wall motion abnormality was identified, this possibility cannot be completely excluded on the basis of this study. Diastolic function is mildly abnormal, consistent with grade I (abnormal) relaxation.   Aortic Valve: The aortic valve is trileaflet. The leaflets are moderately thickened. The leaflets are moderately calcified. There is mildly reduced mobility. There is trace regurgitation. There is mild stenosis.   Mitral Valve: There is trace regurgitation.   Prior TTE study available for comparison. Prior study date: 1/29/2025. Changes noted when compared to prior study. Changes include: Overall ejection fraction and regional wall motion abnormalities have improved.       Review of Systems:  Review of Systems   Constitutional: Negative.    HENT: Negative.     Eyes: Negative.    Respiratory: Negative.     Cardiovascular: Negative.    Gastrointestinal: Negative.    Musculoskeletal: Negative.    Skin: Negative.    Allergic/Immunologic: Negative.    Neurological: Negative.    Hematological: Negative.    Psychiatric/Behavioral: Negative.     All other systems reviewed and are negative.    Vitals:    06/05/25 0818   BP: 138/70   BP Location: Left arm   Patient Position: Sitting   Cuff Size: Standard   Pulse: 58   Weight: 94 kg (207 lb 3.2 oz)   Height: 6' 1\" (1.854 m)       Physical Exam  Vitals and nursing note reviewed.   Constitutional:       Appearance: He is well-developed.   HENT:      Head: Normocephalic and atraumatic.     Eyes:      General: No scleral icterus.        Right eye: No discharge.         Left eye: No discharge.      Pupils: Pupils are equal, round, and reactive to light.     Neck:      Thyroid: No thyromegaly.      Vascular: No JVD.     Cardiovascular:      Rate and Rhythm: Normal rate and regular rhythm. No extrasystoles are present.     Pulses: Normal pulses. No decreased pulses.      Heart sounds: Normal heart sounds, S1 " normal and S2 normal. No murmur heard.     No friction rub. No gallop.   Pulmonary:      Effort: Pulmonary effort is normal. No respiratory distress.      Breath sounds: Normal breath sounds. No wheezing or rales.   Abdominal:      General: Bowel sounds are normal. There is no distension.      Palpations: Abdomen is soft.      Tenderness: There is no abdominal tenderness.     Musculoskeletal:         General: No tenderness or deformity. Normal range of motion.      Cervical back: Normal range of motion and neck supple.     Skin:     General: Skin is warm and dry.      Findings: No rash.     Neurological:      Mental Status: He is alert and oriented to person, place, and time.      Cranial Nerves: No cranial nerve deficit.     Psychiatric:         Thought Content: Thought content normal.         Judgment: Judgment normal.       Counseling / Coordination of Care  Total office time spent today 25 minutes.  Greater than 50% of total time was spent with the patient and / or family counseling and / or coordination of care.           [1]   Patient Active Problem List  Diagnosis    NSTEMI (non-ST elevated myocardial infarction) (HCC)    Essential hypertension    Hyperlipidemia    GERD (gastroesophageal reflux disease)    Pre diabetes    Abnormal CT scan    Ischemic cardiomyopathy    Stage 3b chronic kidney disease (HCC)    Coronary artery disease involving native coronary artery of native heart without angina pectoris    Presence of drug-eluting stent in right coronary artery    Mild aortic stenosis   [2] No past medical history on file.  [3] No family history on file.  [4]   Past Surgical History:  Procedure Laterality Date    CARDIAC CATHETERIZATION N/A 1/30/2025    Procedure: Cardiac Coronary Angiogram;  Surgeon: Foster Araya DO;  Location: [facility];  Service: Cardiology    CARDIAC CATHETERIZATION  1/30/2025    Procedure: Cardiac  Catherization;  Surgeon: Foster Araya DO;  Location: [facility]  CARDIAC CATH LAB;  Service: Cardiology    CARDIAC CATHETERIZATION N/A 1/30/2025    Procedure: Cardiac PCI;  Surgeon: Foster Araya DO;  Location: [facility]ARD[facility];  Service: Cardiology   [5]   Current Outpatient Medications:     Acetaminophen (TYLENOL ARTHRITIS PAIN PO), Tylenol Arthritis Pain, Disp: , Rfl:     aspirin 81 mg chewable tablet, Chew 81 mg in the morning., Disp: , Rfl:     atorvastatin (LIPITOR) 80 mg tablet, Take 1 tablet (80 mg total) by mouth daily with dinner, Disp: 30 tablet, Rfl: 0    Cholecalciferol 50 MCG (2000 UT) CAPS, Take 1 capsule by mouth every 24 hours, Disp: , Rfl:     clindamycin (CLEOCIN) 300 MG capsule, Take 2 capsules by mouth, Disp: , Rfl:     fenofibrate (TRICOR) 145 mg tablet, Take 1 tablet by mouth in the morning., Disp: , Rfl:     fluticasone (Flonase Allergy Relief) 50 mcg/act nasal spray, in the morning and in the evening., Disp: , Rfl:     Folic Acid 0.8 MG CAPS, every 24 hours, Disp: , Rfl:     Glucosamine-Chondroit-Vit C-Mn (GLUCOSAMINE CHONDR 1500 COMPLX PO), Glucosamine Chondr 1500 Complx, Disp: , Rfl:     lisinopril (ZESTRIL) 10 mg tablet, Take 1 tablet (10 mg total) by mouth daily Do not start before February 1, 2025., Disp: 30 tablet, Rfl: 0    metoprolol succinate (TOPROL-XL) 50 mg 24 hr tablet, Take 1 tablet (50 mg total) by mouth daily, Disp: 30 tablet, Rfl: 0    multivitamin (THERAGRAN) TABS, Take 1 tablet by mouth in the morning., Disp: , Rfl:     nitroglycerin (NITROSTAT) 0.4 mg SL tablet, Place 1 tablet (0.4 mg total) under the tongue every 5 (five) minutes as needed for chest pain, Disp: 10 tablet, Rfl: 0    Omega-3 1000 MG CAPS, Take 2 g by mouth in the morning., Disp: , Rfl:     Potassium 99 MG TABS, every 24 hours, Disp: , Rfl:     ticagrelor (BRILINTA) 90 MG, Take 1 tablet (90 mg total) by mouth every 12 (twelve) hours, Disp: 60 tablet, Rfl: 0

## 2025-07-15 ENCOUNTER — APPOINTMENT (OUTPATIENT)
Dept: RADIOLOGY | Facility: CLINIC | Age: 70
End: 2025-07-15
Attending: PHYSICIAN ASSISTANT
Payer: COMMERCIAL

## 2025-07-15 DIAGNOSIS — R05.9 COUGH, UNSPECIFIED TYPE: ICD-10-CM

## 2025-07-15 PROCEDURE — 71046 X-RAY EXAM CHEST 2 VIEWS: CPT

## 2025-07-21 ENCOUNTER — OFFICE VISIT (OUTPATIENT)
Dept: DERMATOLOGY | Facility: CLINIC | Age: 70
End: 2025-07-21
Payer: COMMERCIAL

## 2025-07-21 VITALS — WEIGHT: 204 LBS | BODY MASS INDEX: 26.91 KG/M2 | TEMPERATURE: 97.3 F

## 2025-07-21 DIAGNOSIS — D48.5 NEOPLASM OF UNCERTAIN BEHAVIOR OF SKIN: ICD-10-CM

## 2025-07-21 DIAGNOSIS — L82.1 SEBORRHEIC KERATOSIS: ICD-10-CM

## 2025-07-21 DIAGNOSIS — D22.9 MULTIPLE MELANOCYTIC NEVI: ICD-10-CM

## 2025-07-21 DIAGNOSIS — L57.0 KERATOSIS, ACTINIC: ICD-10-CM

## 2025-07-21 DIAGNOSIS — Z12.83 SKIN EXAM, SCREENING FOR CANCER: Primary | ICD-10-CM

## 2025-07-21 DIAGNOSIS — D18.01 CHERRY ANGIOMA: ICD-10-CM

## 2025-07-21 DIAGNOSIS — L81.4 LENTIGINES: ICD-10-CM

## 2025-07-21 PROCEDURE — 88342 IMHCHEM/IMCYTCHM 1ST ANTB: CPT | Performed by: STUDENT IN AN ORGANIZED HEALTH CARE EDUCATION/TRAINING PROGRAM

## 2025-07-21 PROCEDURE — 17000 DESTRUCT PREMALG LESION: CPT | Performed by: REGISTERED NURSE

## 2025-07-21 PROCEDURE — 11102 TANGNTL BX SKIN SINGLE LES: CPT | Performed by: REGISTERED NURSE

## 2025-07-21 PROCEDURE — 88305 TISSUE EXAM BY PATHOLOGIST: CPT | Performed by: STUDENT IN AN ORGANIZED HEALTH CARE EDUCATION/TRAINING PROGRAM

## 2025-07-21 PROCEDURE — 99214 OFFICE O/P EST MOD 30 MIN: CPT | Performed by: REGISTERED NURSE

## 2025-07-21 PROCEDURE — 17003 DESTRUCT PREMALG LES 2-14: CPT | Performed by: REGISTERED NURSE

## 2025-07-21 RX ORDER — KETOCONAZOLE 20 MG/ML
SHAMPOO, SUSPENSION TOPICAL
Qty: 100 ML | Refills: 10 | Status: CANCELLED | OUTPATIENT
Start: 2025-07-21

## 2025-07-21 NOTE — PROGRESS NOTES
"[facility] Dermatology Clinic Note     Patient Name: Brandon Thompson  Encounter Date: 7/21/2025     Have you been cared for by a [facility] Dermatologist in the last 3 years and, if so, which description applies to you? Yes. I have been here within the last 3 years, and my medical history has NOT changed since that time. I am not of child-bearing potential.     REVIEW OF SYSTEMS:  Have you recently had or currently have any of the following? No changes in my recent health.   PAST MEDICAL HISTORY:  Have you personally ever had or currently have any of the following?  If \"YES,\" then please provide more detail. No changes in my medical history.   HISTORY OF IMMUNOSUPPRESSION: Do you have a history of any of the following:  Systemic Immunosuppression such as Diabetes, Biologic or Immunotherapy, Chemotherapy, Organ Transplantation, Bone Marrow Transplantation or Prednisone?  No     Answering \"YES\" requires the addition of the dotphrase \"IMMUNOSUPPRESSED\" as the first diagnosis of the patient's visit.   FAMILY HISTORY:  Any \"first degree relatives\" (parent, brother, sister, or child) with the following?    No changes in my family's known health.   PATIENT EXPERIENCE:    Do you want the Dermatologist to perform a COMPLETE skin exam today including a clinical examination under the \"bra and underwear\" areas?  Yes  If necessary, do we have your permission to call and leave a detailed message on your Preferred Phone number that includes your specific medical information?  NO      Allergies[1] Current Medications[2]        Whom besides the patient is providing clinical information about today's encounter?   NO ADDITIONAL HISTORIAN (patient alone provided history)    Physical Exam and Assessment/Plan by Diagnosis:    SEBORRHEIC KERATOSES  - Relevant exam: Scattered over the trunk/extremities are waxy brown to black plaques and papules with stuck on appearance  - Exam and clinical history consistent with seborrheic keratoses  - " Counseled that these are benign growths that do not require treatment  - Counseled that removal of lesions is considered cosmetic and so would incur a fee should patient elect to move forward.   - Patient to hold on treatments for now but will inform us should they desire additional treatments    MELANOCYTIC NEVI  -Relevant exam: Scattered over the trunk/extremities are homogenously pigmented brown macules and papules. ELM performed and without concerning findings.  - Exam and clinical history consistent with melanocytic nevi  - Educated on the ABCDE's of melanoma; handout provided  - Counseled to return to clinic prior to scheduled appointment should any of these lesions change or should any new lesions of concern arise  - Counseled on use of sun protection daily. Reviewed latest FDA sunscreen guidelines, including use of broad spectrum (UVA and UVB blocking) sunscreen or sun protective clothing with SPF 30-50 every 2-3 hours and reapplied after exposure to water; use of photoprotective clothing, including a broad brim hat and UPF rated clothing if outdoors for several hours; avoid use of tanning beds as these pose significant risk for melanoma and skin cancer.    LENTIGINES  OTHER SKIN CHANGES DUE TO CHRONIC EXPOSURE TO NONIONIZING RADIATION  - Relevant exam: Over sun exposed areas are brown macules. ELM performed and without concerning findings.  - Exam and clinical history consistent with lentigines.  - Educated that these are indicative of prior sun exposure.   - Counseled to return to clinic prior to scheduled appointment should any of these lesions change or should any new lesions of concern arise.  - Recommended use of sunscreen as above and below.  - Counseled on use of sun protection daily. Reviewed latest FDA sunscreen guidelines, including use of broad spectrum (UVA and UVB blocking) sunscreen or sun protective clothing with SPF 30-50 every 2-3 hours and reapplied after exposure to water; use of  photoprotective clothing, including a broad brim hat and UPF rated clothing if outdoors for several hours; avoid use of tanning beds as these pose significant risk for melanoma and skin cancer.    CHERRY ANGIOMAS  - Relevant exam: Scattered over the trunk/extremities are red papules  - Exam and clinical history consistent with cherry angiomas  - Educated that these are benign  - Educated that removal is considered aesthetic and would incur a fee.  - Patient does not wish to pursue removal at this time but will contact us should this change.    ACTINIC KERATOSES  - Relevant exam: On the Left and right superior helices are gritty pink papules  - Exam and clinical history consistent with actinic keratoses  - Discussed that these lesions are considered premalignant with the potential to evolve into squamous cell carcinoma.   - Discussed that these are due to chronic sun exposure and therefore recommend use of sunscreen/sun protection to prevent further sun damage  - Discussed treatment options, including liquid nitrogen destruction, topical immunotherapy, and photodynamic therapy, including risks, benefits    PROCEDURE:  DESTRUCTION OF PRE-MALIGNANT LESIONS    - After a thorough discussion of treatment options and risk/benefits/alternatives (including but not limited to local pain, scarring, dyspigmentation, blistering, and possible superinfection), verbal and written consent were obtained and the aforementioned lesions were treated on with cryotherapy using liquid nitrogen x 1 cycle for 5-10 seconds.    TOTAL NUMBER of 2 pre-malignant lesions were treated today on the ANATOMIC LOCATION: Left and right superior helices.     The patient tolerated the procedure well, and after-care instructions were provided.    NEOPLASM OF UNCERTAIN BEHAVIOR OF SKIN    Physical Exam:  (Anatomic Location); (Size and Morphological Description); (Differential Diagnosis):  A- left temple; 0.5cm x 0.4cm brown macule; ddx sk v.  "atypia                Additional History of Present Condition:  noted on exam    Assessment and Plan:  I have discussed with the patient that a sample of skin via a \"skin biopsy” would be potentially helpful to further make a specific diagnosis under the microscope.  Based on a thorough discussion of this condition and the management approach to it (including a comprehensive discussion of the known risks, side effects and potential benefits of treatment), the patient (family) agrees to implement the following specific plan:    Procedure:  Skin Biopsy.  After a thorough discussion of treatment options and risk/benefits/alternatives (including but not limited to local pain, scarring, dyspigmentation, blistering, possible superinfection, and inability to confirm a diagnosis via histopathology), verbal and written consent were obtained and portion of the rash was biopsied for tissue sample.  See below for consent that was obtained from patient and subsequent Procedure Note.    PROCEDURE TANGENTIAL (SHAVE) BIOPSY NOTE:    Performing Physician: Dr. Shultz  Anatomic Location; Clinical Description with size (cm); Pre-Op Diagnosis:   A- left temple; 0.5cm x 0.4cm brown macule; ddx sk v. atypia  Post-op diagnosis: Same     Local anesthesia: 1% xylocaine with epi      Topical anesthesia: None    Hemostasis: Aluminum chloride       After obtaining informed consent  at which time there was a discussion about the purpose of biopsy  and low risks of infection and bleeding.  The area was prepped and draped in the usual fashion. Anesthesia was obtained with 1% lidocaine with epinephrine. A shave biopsy to an appropriate sampling depth was obtained by Shave (Dermablade or 15 blade) The resulting wound was covered with surgical ointment and bandaged appropriately.     The patient tolerated the procedure well without complications and was without signs of functional compromise.      Specimen has been sent for review by " "Dermatopathology.    Standard post-procedure care has been explained and has been included in written form within the patient's copy of Informed Consent.    INFORMED CONSENT DISCUSSION AND POST-OPERATIVE INSTRUCTIONS FOR PATIENT    I.  RATIONALE FOR PROCEDURE  I understand that a skin biopsy allows the Dermatologist to test a lesion or rash under the microscope to obtain a diagnosis.  It usually involves numbing the area with numbing medication and removing a small piece of skin; sometimes the area will be closed with sutures. In this specific procedure, sutures are not usually needed.  If any sutures are placed, then they are usually need to be removed in 2 weeks or less.    I understand that my Dermatologist recommends that a skin \"shave\" biopsy be performed today.  A local anesthetic, similar to the kind that a dentist uses when filling a cavity, will be injected with a very small needle into the skin area to be sampled.  The injected skin and tissue underneath \"will go to sleep” and become numb so no pain should be felt afterwards.  An instrument shaped like a tiny \"razor blade\" (shave biopsy instrument) will be used to cut a small piece of tissue and skin from the area so that a sample of tissue can be taken and examined more closely under the microscope.  A slight amount of bleeding will occur, but it will be stopped with direct pressure and a pressure bandage and any other appropriate methods.  I understands that a scar will form where the wound was created.  Surgical ointment will be applied to help protect the wound.  Sutures are not usually needed.    II.  RISKS AND POTENTIAL COMPLICATIONS   I understand the risks and potential complications of a skin biopsy include but are not limited to the following:  Bleeding  Infection  Pain  Scar/keloid  Skin discoloration  Incomplete Removal  Recurrence  Nerve Damage/Numbness/Loss of Function  Allergic Reaction to Anesthesia  Biopsies are diagnostic procedures and " "based on findings additional treatment or evaluation may be required  Loss or destruction of specimen resulting in no additional findings    My Dermatologist has explained to me the nature of the condition, the nature of the procedure, and the benefits to be reasonably expected compared with alternative approaches.  My Dermatologist has discussed the likelihood of major risks or complications of this procedure including the specific risks listed above, such as bleeding, infection, and scarring/keloid.  I understand that a scar is expected after this procedure.  I understand that my physician cannot predict if the scar will form a \"keloid,\" which extends beyond the borders of the wound that is created.  A keloid is a thick, painful, and bumpy scar.  A keloid can be difficult to treat, as it does not always respond well to therapy, which includes injecting cortisone directly into the keloid every few weeks.  While this usually reduces the pain and size of the scar, it does not eliminate it.      I understand that photographs may be taken before and after the procedure.  These will be maintained as part of the medical providers confidential records and may not be made available to me.  I further authorize the medical provider to use the photographs for teaching purposes or to illustrate scientific papers, books, or lectures if in his/her judgment, medical research, education, or science may benefit from its use.    I have had an opportunity to fully inquire about the risks and benefits of this procedure and its alternatives.   I have been given ample time and opportunity to ask questions and to seek a second opinion if I wished to do so.  I acknowledge that there have specifically been no guarantees as to the cosmetic results from the procedure.  I am aware that with any procedure there is always the possibility of an unexpected complication.    III. POST-PROCEDURAL CARE (WHAT YOU WILL NEED TO DO \"AFTER THE BIOPSY\" TO " "OPTIMIZE HEALING)    Keep the area clean and dry.  Try NOT to remove the bandage or get it wet for the first 24 hours.    Gently clean the area and apply surgical ointment (such as Vaseline petrolatum ointment, which is available \"over the counter\" and not a prescription) to the biopsy site for up to 2 weeks straight.  This acts to protect the wound from the outside world.      Sutures are not usually placed in this procedure.  If any sutures were placed, return for suture removal as instructed (generally 1 week for the face, 2 weeks for the body).      Take Acetaminophen (Tylenol) for discomfort, if no contraindications.  Ibuprofen or aspirin could make bleeding worse.    Call our office immediately for signs of infection: fever, chills, increased redness, warmth, tenderness, discomfort/pain, or pus or foul smell coming from the wound.    WHAT TO DO IF THERE IS ANY BLEEDING?  If a small amount of bleeding is noticed, place a clean cloth over the area and apply firm pressure for ten minutes.  Check the wound after 10 minutes of direct pressure.  If bleeding persists, try one more time for an additional 10 minutes of direct pressure on the area.  If the bleeding becomes heavier or does not stop after the second attempt, or if you have any other questions about this procedure, then please call your [facility] Dermatologist by calling [phone] (SKIN).     I hereby acknowledge that I have reviewed and verified the site with my Dermatologist and have requested and authorized my Dermatologist to proceed with the procedure.    Scribe Attestation      I,:  Ellis Gasca MA am acting as a scribe while in the presence of the attending physician.:       I,:  Edgar Alva MD personally performed the services described in this documentation    as scribed in my presence.:                [1]   Allergies  Allergen Reactions    Penicillins Edema and Swelling    Sulfa Antibiotics Edema and Swelling   [2]   Current " Outpatient Medications:     Acetaminophen (TYLENOL ARTHRITIS PAIN PO), Tylenol Arthritis Pain, Disp: , Rfl:     aspirin 81 mg chewable tablet, Chew 81 mg in the morning., Disp: , Rfl:     atorvastatin (LIPITOR) 80 mg tablet, Take 1 tablet (80 mg total) by mouth daily with dinner, Disp: 30 tablet, Rfl: 0    Cholecalciferol 50 MCG (2000 UT) CAPS, Take 1 capsule by mouth every 24 hours, Disp: , Rfl:     clindamycin (CLEOCIN) 300 MG capsule, Take 2 capsules by mouth, Disp: , Rfl:     fenofibrate (TRICOR) 145 mg tablet, Take 1 tablet by mouth in the morning., Disp: , Rfl:     fluticasone (Flonase Allergy Relief) 50 mcg/act nasal spray, in the morning and in the evening., Disp: , Rfl:     Folic Acid 0.8 MG CAPS, every 24 hours, Disp: , Rfl:     Glucosamine-Chondroit-Vit C-Mn (GLUCOSAMINE CHONDR 1500 COMPLX PO), Glucosamine Chondr 1500 Complx, Disp: , Rfl:     lisinopril (ZESTRIL) 10 mg tablet, Take 1 tablet (10 mg total) by mouth daily Do not start before February 1, 2025., Disp: 30 tablet, Rfl: 0    metoprolol succinate (TOPROL-XL) 50 mg 24 hr tablet, Take 1 tablet (50 mg total) by mouth daily, Disp: 30 tablet, Rfl: 0    multivitamin (THERAGRAN) TABS, Take 1 tablet by mouth in the morning., Disp: , Rfl:     nitroglycerin (NITROSTAT) 0.4 mg SL tablet, Place 1 tablet (0.4 mg total) under the tongue every 5 (five) minutes as needed for chest pain, Disp: 10 tablet, Rfl: 0    Omega-3 1000 MG CAPS, Take 2 g by mouth in the morning., Disp: , Rfl:     Potassium 99 MG TABS, every 24 hours, Disp: , Rfl:     ticagrelor (BRILINTA) 90 MG, Take 1 tablet (90 mg total) by mouth every 12 (twelve) hours, Disp: 60 tablet, Rfl: 0

## 2025-07-28 PROCEDURE — 88305 TISSUE EXAM BY PATHOLOGIST: CPT | Performed by: STUDENT IN AN ORGANIZED HEALTH CARE EDUCATION/TRAINING PROGRAM

## 2025-07-28 PROCEDURE — 88342 IMHCHEM/IMCYTCHM 1ST ANTB: CPT | Performed by: STUDENT IN AN ORGANIZED HEALTH CARE EDUCATION/TRAINING PROGRAM

## (undated) DEVICE — DGW .035 FC J3MM 260CM TEF: Brand: EMERALD

## (undated) DEVICE — CATH GUIDE LAUNCHER 6FR JR 4.0

## (undated) DEVICE — BALLOON EUPHORA RX 3 X 15MM

## (undated) DEVICE — RADIFOCUS OPTITORQUE ANGIOGRAPHIC CATHETER: Brand: OPTITORQUE

## (undated) DEVICE — GLIDESHEATH BASIC HYDROPHILIC COATED INTRODUCER SHEATH: Brand: GLIDESHEATH

## (undated) DEVICE — RUNTHROUGH NS EXTRA FLOPPY PTCA GUIDEWIRE: Brand: RUNTHROUGH

## (undated) DEVICE — TR BAND RADIAL ARTERY COMPRESSION DEVICE: Brand: TR BAND

## (undated) DEVICE — GUIDEWIRE WHOLEY .035 145 CM FLOP STR TIP